# Patient Record
Sex: MALE | Race: ASIAN | NOT HISPANIC OR LATINO
[De-identification: names, ages, dates, MRNs, and addresses within clinical notes are randomized per-mention and may not be internally consistent; named-entity substitution may affect disease eponyms.]

---

## 2020-12-29 ENCOUNTER — APPOINTMENT (OUTPATIENT)
Dept: NEUROLOGY | Facility: CLINIC | Age: 80
End: 2020-12-29

## 2020-12-29 VITALS — TEMPERATURE: 96.7 F

## 2020-12-29 PROBLEM — Z00.00 ENCOUNTER FOR PREVENTIVE HEALTH EXAMINATION: Status: ACTIVE | Noted: 2020-12-29

## 2021-04-19 ENCOUNTER — APPOINTMENT (OUTPATIENT)
Dept: NEUROLOGY | Facility: CLINIC | Age: 81
End: 2021-04-19
Payer: MEDICARE

## 2021-04-19 VITALS — TEMPERATURE: 97.6 F

## 2021-04-19 VITALS
BODY MASS INDEX: 24.86 KG/M2 | HEIGHT: 68 IN | SYSTOLIC BLOOD PRESSURE: 167 MMHG | DIASTOLIC BLOOD PRESSURE: 82 MMHG | HEART RATE: 69 BPM | WEIGHT: 164 LBS

## 2021-04-19 DIAGNOSIS — E78.5 HYPERLIPIDEMIA, UNSPECIFIED: ICD-10-CM

## 2021-04-19 DIAGNOSIS — I10 ESSENTIAL (PRIMARY) HYPERTENSION: ICD-10-CM

## 2021-04-19 DIAGNOSIS — E11.9 TYPE 2 DIABETES MELLITUS W/OUT COMPLICATIONS: ICD-10-CM

## 2021-04-19 DIAGNOSIS — R68.89 OTHER GENERAL SYMPTOMS AND SIGNS: ICD-10-CM

## 2021-04-19 DIAGNOSIS — I48.91 UNSPECIFIED ATRIAL FIBRILLATION: ICD-10-CM

## 2021-04-19 PROCEDURE — 99205 OFFICE O/P NEW HI 60 MIN: CPT

## 2021-04-19 PROCEDURE — 99072 ADDL SUPL MATRL&STAF TM PHE: CPT

## 2021-04-19 RX ORDER — BENAZEPRIL HYDROCHLORIDE 40 MG/1
40 TABLET, FILM COATED ORAL DAILY
Refills: 0 | Status: ACTIVE | COMMUNITY
Start: 2021-04-19

## 2021-04-19 RX ORDER — ATORVASTATIN CALCIUM 20 MG/1
20 TABLET, FILM COATED ORAL
Refills: 0 | Status: ACTIVE | COMMUNITY
Start: 2021-04-19

## 2021-04-19 RX ORDER — DRONEDARONE 400 MG/1
400 TABLET, FILM COATED ORAL
Refills: 0 | Status: ACTIVE | COMMUNITY
Start: 2021-04-19

## 2021-04-19 RX ORDER — NEBIVOLOL HYDROCHLORIDE 5 MG/1
5 TABLET ORAL DAILY
Refills: 0 | Status: ACTIVE | COMMUNITY

## 2021-04-19 RX ORDER — METFORMIN HYDROCHLORIDE 500 MG/1
500 TABLET, COATED ORAL
Refills: 0 | Status: ACTIVE | COMMUNITY
Start: 2021-04-19

## 2021-04-19 RX ORDER — RIVAROXABAN 20 MG/1
20 TABLET, FILM COATED ORAL
Refills: 0 | Status: ACTIVE | COMMUNITY

## 2021-04-19 NOTE — HISTORY OF PRESENT ILLNESS
[FreeTextEntry1] : HPI: 81yo RH man with HTN, HLD, DM, here for forgetfulness.\par Exotropia (bilat) which dates many years ago, has diplopia, e/p sx years ago at [facility], recurred. Unclear etiology.\par  \par PMH: pt has noticed to have STM issues for 2-3 years, getting worse more recently.\par \par -Memory: STM, recent events, reason he goes into a room for etc. Names ok\par -Speech: ok, seldom WFD and losing train of thought\par -Orientation: ok\par -Praxis: ok\par -Decision making/Executive fx/Multitasking: ok\par \par -Sleep: ok\par \par -Appetite: ok\par \par -Motor symptoms: ok, no issues\par \par -B/B: ok\par \par -Psychiatric symptoms: ok\par \par -Functional status:\par ADL: full\par IADL: full, stopped driving due to pain in knees\par CDR: n.a.\par \par -Professional status: retired, bank\par \par PCP and other physicians:\par -PCP: \par Luis Regalado MD\par [place], [place] · [phone]\par -Cardio:\par Dr. Nhung Liang MD\par Internist in [place], [place]\par COVID-19 info: [email or web address]\par Address: [address] [place] [id number]\par Phone: [phone]\par \par Workup done: none.

## 2021-04-19 NOTE — PHYSICAL EXAM
[General Appearance - Alert] : alert [General Appearance - In No Acute Distress] : in no acute distress [Impaired Insight] : insight and judgment were intact [Oriented To Time, Place, And Person] : oriented to person, place, and time [Affect] : the affect was normal [Person] : oriented to person [Place] : oriented to place [Time] : oriented to time [Concentration Intact] : normal concentrating ability [Visual Intact] : visual attention was ~T not ~L decreased [Naming Objects] : no difficulty naming common objects [Repeating Phrases] : no difficulty repeating a phrase [Writing A Sentence] : no difficulty writing a sentence [Fluency] : fluency intact [Comprehension] : comprehension intact [Reading] : reading intact [Past History] : adequate knowledge of personal past history [Date / Time ___ / 5] : date / time [unfilled] / 5 [Place ___ / 5] : place [unfilled] / 5 [Registration ___ / 3] : registration [unfilled] / 3 [Serial Sevens ___/5] : serial sevens [unfilled] / 5 [Naming 2 Objects ___ / 2] : naming two objects [unfilled] / 2 [Repeating a Sentence ___ / 1] : repeating a sentence [unfilled] / 1 [Writing a Sentence ___ / 1] : write sentence [unfilled] / 1 [3-stage Verbal Command ___ / 3] : three-stage verbal command [unfilled] / 3 [Written Command ___ / 1] : written command [unfilled] / 1 [Cranial Nerves Optic (II)] : visual acuity intact bilaterally,  visual fields full to confrontation, pupils equal round and reactive to light [Cranial Nerves Oculomotor (III)] : extraocular motion intact [Cranial Nerves Trigeminal (V)] : facial sensation intact symmetrically [Cranial Nerves Facial (VII)] : face symmetrical [Cranial Nerves Glossopharyngeal (IX)] : tongue and palate midline [Cranial Nerves Vestibulocochlear (VIII)] : hearing was intact bilaterally [Cranial Nerves Accessory (XI - Cranial And Spinal)] : head turning and shoulder shrug symmetric [Cranial Nerves Hypoglossal (XII)] : there was no tongue deviation with protrusion [Motor Strength] : muscle strength was normal in all four extremities [No Muscle Atrophy] : normal bulk in all four extremities [Sensation Tactile Decrease] : light touch was intact [Balance] : balance was intact [2+] : Brachioradialis left 2+ [Current Events] : adequate knowledge of current events [Total Score ___ / 30] : the patient achieved a score of [unfilled] /30 [Copy a Design ___ / 1] : copy a design [unfilled] / 1 [Recall ___ / 3] : recall [unfilled] / 3 [Involuntary Movements] : no involuntary movements were seen [Motor Handedness Right-Handed] : the patient is right hand dominant [Sensation Pain / Temperature Decrease] : pain and temperature was intact [Sensation Vibration Decrease] : vibration was intact [Proprioception] : proprioception was intact [1+] : Ankle jerk left 1+ [Sclera] : the sclera and conjunctiva were normal [PERRL With Normal Accommodation] : pupils were equal in size, round, reactive to light, with normal accommodation [Extraocular Movements] : extraocular movements were intact [Optic Disc Abnormality] : the optic disc were normal in size and color [No APD] : no afferent pupillary defect [No MARIEL] : no internuclear ophthalmoplegia [Full Visual Field] : full visual field [Outer Ear] : the ears and nose were normal in appearance [Oropharynx] : the oropharynx was normal [Neck Appearance] : the appearance of the neck was normal [Neck Cervical Mass (___cm)] : no neck mass was observed [Jugular Venous Distention Increased] : there was no jugular-venous distention [Thyroid Diffuse Enlargement] : the thyroid was not enlarged [Thyroid Nodule] : there were no palpable thyroid nodules [Auscultation Breath Sounds / Voice Sounds] : lungs were clear to auscultation bilaterally [Heart Rate And Rhythm] : heart rate was normal and rhythm regular [Heart Sounds] : normal S1 and S2 [Heart Sounds Gallop] : no gallops [Murmurs] : no murmurs [Heart Sounds Pericardial Friction Rub] : no pericardial rub [Arterial Pulses Carotid] : carotid pulses were normal with no bruits [Full Pulse] : the pedal pulses are present [Edema] : there was no peripheral edema [Bowel Sounds] : normal bowel sounds [Abdomen Soft] : soft [Abdomen Tenderness] : non-tender [Abdomen Mass (___ Cm)] : no abdominal mass palpated [No CVA Tenderness] : no ~M costovertebral angle tenderness [No Spinal Tenderness] : no spinal tenderness [Abnormal Walk] : normal gait [Nail Clubbing] : no clubbing  or cyanosis of the fingernails [Musculoskeletal - Swelling] : no joint swelling seen [Motor Tone] : muscle strength and tone were normal [Skin Color & Pigmentation] : normal skin color and pigmentation [] : no rash [Skin Turgor] : normal skin turgor [Motor Strength Upper Extremities Bilaterally] : strength was normal in both upper extremities [Romberg's Sign] : Romberg's sign was negtive [Motor Strength Lower Extremities Bilaterally] : strength was normal in both lower extremities [Allodynia] : no ~T allodynia present [Dysesthesia] : no dysesthesia [Hyperesthesia] : no hyperesthesia [Past-pointing] : there was no past-pointing [Tremor] : no tremor present [Plantar Reflex Right Only] : normal on the right [Plantar Reflex Left Only] : normal on the left [FreeTextEntry4] : Mental Status Exam\par Presidents: 3/5\par Alternating Pattern: ok\par Spiral: ok\par Clock: 3/3\par Repetition: ok \par \par R/L discrimination on self and examiner: ok\par Cross-line commands: ok\par Praxis: ok [FreeTextEntry5] : Eugene exotropia, mild diplopia [FreeTextEntry1] : Normal EOM, but there is variable exotropia. No skew.

## 2021-04-19 NOTE — ASSESSMENT
[FreeTextEntry1] : Assessment:\par 79yo RH man with vascular risk, here for subjective forgetfulness.\par Exam mostly benign. \par \par Diagnostic Impression:\par -forgetfulness\par \par Plan:\par Rule out reversible and vascular causes:\par -B vitamins, TFT, RPR\par -MRI brain w/o ulises\par -basic inflammatory labs\par -Lyme serology\par -pt to call in accurate list of meds, as he does not know the dosage\par -no meds for now.\par \par A thorough discussion was entertained with the patient/caregiver regarding the use of psychoactive medications, their possible benefits and AE profile, including the risk of cardiovascular complications, including but not limited to applicable black box warning and teratogenicity, where appropriate.\par We discussed the benefits of being active, physically and mentally, and the need to to establish a routine in this respect.\par Driving abilities and firearms possession and use were discussed, in relation to progression of the cognitive decline, and the need to assess them periodically.\par Patient/caregiver advised to bring previous records to this office.\par All questions were answered at the time of the visit. We are certainly available for further discussion as needed.\par Patient/caregiver fully understands and agree with the plan.\par

## 2021-05-16 ENCOUNTER — APPOINTMENT (OUTPATIENT)
Dept: MRI IMAGING | Facility: CLINIC | Age: 81
End: 2021-05-16

## 2021-05-16 LAB
B BURGDOR AB SER-IMP: NEGATIVE
B BURGDOR IGG+IGM SER QL: 0.16 INDEX
FOLATE SERPL-MCNC: 14.1 NG/ML
HCYS SERPL-MCNC: 11.6 UMOL/L
T PALLIDUM AB SER QL IA: NEGATIVE
T3FREE SERPL-MCNC: 2.53 PG/ML
T4 FREE SERPL-MCNC: 1.1 NG/DL
TSH SERPL-ACNC: 0.27 UIU/ML
VIT B12 SERPL-MCNC: 392 PG/ML

## 2021-05-21 ENCOUNTER — APPOINTMENT (OUTPATIENT)
Dept: MRI IMAGING | Facility: CLINIC | Age: 81
End: 2021-05-21

## 2021-05-21 LAB — VIT B1 SERPL-MCNC: 92.5 NMOL/L

## 2021-05-27 LAB — METHYLMALONATE SERPL-SCNC: 366 NMOL/L

## 2021-05-28 ENCOUNTER — APPOINTMENT (OUTPATIENT)
Dept: MRI IMAGING | Facility: CLINIC | Age: 81
End: 2021-05-28

## 2021-05-29 ENCOUNTER — APPOINTMENT (OUTPATIENT)
Dept: MRI IMAGING | Facility: IMAGING CENTER | Age: 81
End: 2021-05-29
Payer: MEDICARE

## 2021-05-29 ENCOUNTER — OUTPATIENT (OUTPATIENT)
Dept: OUTPATIENT SERVICES | Facility: HOSPITAL | Age: 81
LOS: 1 days | End: 2021-05-29
Payer: MEDICARE

## 2021-05-29 DIAGNOSIS — R68.89 OTHER GENERAL SYMPTOMS AND SIGNS: ICD-10-CM

## 2021-05-29 PROCEDURE — 70551 MRI BRAIN STEM W/O DYE: CPT | Mod: 26

## 2021-06-01 PROCEDURE — 70551 MRI BRAIN STEM W/O DYE: CPT

## 2021-06-14 ENCOUNTER — NON-APPOINTMENT (OUTPATIENT)
Age: 81
End: 2021-06-14

## 2021-06-15 RX ORDER — GALANTAMINE 8 MG/1
8 CAPSULE, EXTENDED RELEASE ORAL
Qty: 30 | Refills: 1 | Status: ACTIVE | COMMUNITY
Start: 2021-06-15 | End: 1900-01-01

## 2021-10-19 ENCOUNTER — APPOINTMENT (OUTPATIENT)
Dept: NEUROLOGY | Facility: CLINIC | Age: 81
End: 2021-10-19

## 2024-02-29 ENCOUNTER — INPATIENT (INPATIENT)
Facility: HOSPITAL | Age: 84
LOS: 4 days | Discharge: HOME | End: 2024-03-05
Attending: INTERNAL MEDICINE | Admitting: INTERNAL MEDICINE
Payer: MEDICARE

## 2024-02-29 VITALS
RESPIRATION RATE: 15 BRPM | HEART RATE: 85 BPM | OXYGEN SATURATION: 100 % | TEMPERATURE: 100 F | DIASTOLIC BLOOD PRESSURE: 85 MMHG | SYSTOLIC BLOOD PRESSURE: 150 MMHG

## 2024-02-29 LAB
ALBUMIN SERPL ELPH-MCNC: 4 G/DL — SIGNIFICANT CHANGE UP (ref 3.3–5)
ALP SERPL-CCNC: 89 U/L — SIGNIFICANT CHANGE UP (ref 40–120)
ALT FLD-CCNC: 18 U/L — SIGNIFICANT CHANGE UP (ref 4–41)
ANION GAP SERPL CALC-SCNC: 10 MMOL/L — SIGNIFICANT CHANGE UP (ref 7–14)
APTT BLD: 34.6 SEC — SIGNIFICANT CHANGE UP (ref 24.5–35.6)
AST SERPL-CCNC: 31 U/L — SIGNIFICANT CHANGE UP (ref 4–40)
BASE EXCESS BLDV CALC-SCNC: -0.7 MMOL/L — SIGNIFICANT CHANGE UP (ref -2–3)
BILIRUB SERPL-MCNC: 0.5 MG/DL — SIGNIFICANT CHANGE UP (ref 0.2–1.2)
BLOOD GAS VENOUS COMPREHENSIVE RESULT: SIGNIFICANT CHANGE UP
BUN SERPL-MCNC: 22 MG/DL — SIGNIFICANT CHANGE UP (ref 7–23)
CALCIUM SERPL-MCNC: 8.7 MG/DL — SIGNIFICANT CHANGE UP (ref 8.4–10.5)
CHLORIDE BLDV-SCNC: 100 MMOL/L — SIGNIFICANT CHANGE UP (ref 96–108)
CHLORIDE SERPL-SCNC: 101 MMOL/L — SIGNIFICANT CHANGE UP (ref 98–107)
CO2 BLDV-SCNC: 26.8 MMOL/L — HIGH (ref 22–26)
CO2 SERPL-SCNC: 25 MMOL/L — SIGNIFICANT CHANGE UP (ref 22–31)
CREAT SERPL-MCNC: 1.09 MG/DL — SIGNIFICANT CHANGE UP (ref 0.5–1.3)
EGFR: 67 ML/MIN/1.73M2 — SIGNIFICANT CHANGE UP
GAS PNL BLDV: 131 MMOL/L — LOW (ref 136–145)
GLUCOSE BLDV-MCNC: 176 MG/DL — HIGH (ref 70–99)
GLUCOSE SERPL-MCNC: 175 MG/DL — HIGH (ref 70–99)
HCO3 BLDV-SCNC: 25 MMOL/L — SIGNIFICANT CHANGE UP (ref 22–29)
HCT VFR BLD CALC: 39.8 % — SIGNIFICANT CHANGE UP (ref 39–50)
HCT VFR BLDA CALC: 37 % — LOW (ref 39–51)
HGB BLD CALC-MCNC: 12.4 G/DL — LOW (ref 12.6–17.4)
HGB BLD-MCNC: 12.2 G/DL — LOW (ref 13–17)
IANC: 5.69 K/UL — SIGNIFICANT CHANGE UP (ref 1.8–7.4)
INR BLD: 1.06 RATIO — SIGNIFICANT CHANGE UP (ref 0.85–1.18)
LACTATE BLDV-MCNC: 1 MMOL/L — SIGNIFICANT CHANGE UP (ref 0.5–2)
MCHC RBC-ENTMCNC: 25.5 PG — LOW (ref 27–34)
MCHC RBC-ENTMCNC: 30.7 GM/DL — LOW (ref 32–36)
MCV RBC AUTO: 83.3 FL — SIGNIFICANT CHANGE UP (ref 80–100)
PCO2 BLDV: 47 MMHG — SIGNIFICANT CHANGE UP (ref 42–55)
PH BLDV: 7.34 — SIGNIFICANT CHANGE UP (ref 7.32–7.43)
PLATELET # BLD AUTO: 104 K/UL — LOW (ref 150–400)
PO2 BLDV: 25 MMHG — SIGNIFICANT CHANGE UP (ref 25–45)
POTASSIUM BLDV-SCNC: 4.8 MMOL/L — SIGNIFICANT CHANGE UP (ref 3.5–5.1)
POTASSIUM SERPL-MCNC: 4.9 MMOL/L — SIGNIFICANT CHANGE UP (ref 3.5–5.3)
POTASSIUM SERPL-SCNC: 4.9 MMOL/L — SIGNIFICANT CHANGE UP (ref 3.5–5.3)
PROT SERPL-MCNC: 7.5 G/DL — SIGNIFICANT CHANGE UP (ref 6–8.3)
PROTHROM AB SERPL-ACNC: 12 SEC — SIGNIFICANT CHANGE UP (ref 9.5–13)
RBC # BLD: 4.78 M/UL — SIGNIFICANT CHANGE UP (ref 4.2–5.8)
RBC # FLD: 13.7 % — SIGNIFICANT CHANGE UP (ref 10.3–14.5)
SAO2 % BLDV: 39.4 % — LOW (ref 67–88)
SODIUM SERPL-SCNC: 136 MMOL/L — SIGNIFICANT CHANGE UP (ref 135–145)
WBC # BLD: 6.93 K/UL — SIGNIFICANT CHANGE UP (ref 3.8–10.5)
WBC # FLD AUTO: 6.93 K/UL — SIGNIFICANT CHANGE UP (ref 3.8–10.5)

## 2024-02-29 PROCEDURE — 99291 CRITICAL CARE FIRST HOUR: CPT

## 2024-02-29 RX ORDER — MAGNESIUM SULFATE 500 MG/ML
2 VIAL (ML) INJECTION ONCE
Refills: 0 | Status: COMPLETED | OUTPATIENT
Start: 2024-02-29 | End: 2024-02-29

## 2024-02-29 RX ORDER — ACETAMINOPHEN 500 MG
1000 TABLET ORAL ONCE
Refills: 0 | Status: COMPLETED | OUTPATIENT
Start: 2024-02-29 | End: 2024-02-29

## 2024-02-29 RX ORDER — IPRATROPIUM/ALBUTEROL SULFATE 18-103MCG
3 AEROSOL WITH ADAPTER (GRAM) INHALATION
Refills: 0 | Status: COMPLETED | OUTPATIENT
Start: 2024-02-29 | End: 2024-02-29

## 2024-02-29 RX ADMIN — Medication 3 MILLILITER(S): at 23:36

## 2024-02-29 RX ADMIN — Medication 125 MILLIGRAM(S): at 23:51

## 2024-02-29 RX ADMIN — Medication 400 MILLIGRAM(S): at 23:52

## 2024-02-29 RX ADMIN — Medication 150 GRAM(S): at 23:52

## 2024-02-29 NOTE — ED ADULT NURSE NOTE - CHIEF COMPLAINT QUOTE
FLU+.2/26. presents c/o SOB. x5 days. worsening today. placed on 2L nc.  No complaints of chest pain, headache, nausea, dizziness, vomiting  SOB, fever, chills verbalized. Phx afib HTN Dm2

## 2024-02-29 NOTE — ED ADULT TRIAGE NOTE - CHIEF COMPLAINT QUOTE
presents c/o SOB. x5 days. worsening today. placed on 2L nc.  No complaints of chest pain, headache, nausea, dizziness, vomiting  SOB, fever, chills verbalized. Phx afib HTN Dm2 FLU+.2/26. presents c/o SOB. x5 days. worsening today. placed on 2L nc.  No complaints of chest pain, headache, nausea, dizziness, vomiting  SOB, fever, chills verbalized. Phx afib HTN Dm2

## 2024-02-29 NOTE — ED ADULT NURSE NOTE - OBJECTIVE STATEMENT
Pt brought to room 20, FLU+.2/26. presents c/o SOB. x5 days. worsening today. placed on 2L nc.  No complaints of chest pain, headache, nausea, dizziness, vomiting  SOB, fever, chills verbalized. Phx afib HTN Dm2  report given from nurse , patient laying in semi fowlers position on the stretcher. patient alert and oriented times four. patient denies shortness of breath, chest pain, nausea, vomiting, chill, fever. Patient normal sinus on the monitor. Respirations equal and adequate. Two IVs placed 20 right arm and 20 left a.c patent, no signs of infiltration. Safety measures in place, call bell within reach. Patient stable upon leaving the room. Blood cultures sent prior to antibiotic administration. Patient febrile MD. weight in chart.   Shadia aware. Patient to be medicated as per emr. patient on 2 liters nasal cannula and satting at 98 percent. Pt brought to room 20, FLU+.2/26. presents c/o SOB. x5 days. worsening today. placed on 2L nc.  No complaints of chest pain, headache, nausea, dizziness, vomiting  SOB, fever, chills verbalized. Phx afib HTN Dm2  report given from nurse , patient laying in semi fowlers position on the stretcher. patient alert and oriented times four, patient denies chest pain, nausea, vomiting, chill, fever. Patient normal sinus on the monitor. Respirations equal and adequate. Two IVs placed 20 right arm and 20 left a.c patent, no signs of infiltration. Safety measures in place, call bell within reach. Patient stable upon leaving the room. Blood cultures sent prior to antibiotic administration. Patient febrile MD. weight in chart.   Shadia aware. Patient to be medicated as per emr. patient on 2 liters nasal cannula and satting at 98 percent.

## 2024-02-29 NOTE — ED ADULT NURSE NOTE - NSFALLUNIVINTERV_ED_ALL_ED
Bed/Stretcher in lowest position, wheels locked, appropriate side rails in place/Call bell, personal items and telephone in reach/Instruct patient to call for assistance before getting out of bed/chair/stretcher/Non-slip footwear applied when patient is off stretcher/[place] to call system/Physically safe environment - no spills, clutter or unnecessary equipment/Purposeful proactive rounding/Room/bathroom lighting operational, light cord in reach

## 2024-03-01 DIAGNOSIS — J96.01 ACUTE RESPIRATORY FAILURE WITH HYPOXIA: ICD-10-CM

## 2024-03-01 DIAGNOSIS — I48.91 UNSPECIFIED ATRIAL FIBRILLATION: ICD-10-CM

## 2024-03-01 DIAGNOSIS — J45.909 UNSPECIFIED ASTHMA, UNCOMPLICATED: ICD-10-CM

## 2024-03-01 DIAGNOSIS — Z29.9 ENCOUNTER FOR PROPHYLACTIC MEASURES, UNSPECIFIED: ICD-10-CM

## 2024-03-01 DIAGNOSIS — I10 ESSENTIAL (PRIMARY) HYPERTENSION: ICD-10-CM

## 2024-03-01 DIAGNOSIS — J45.901 UNSPECIFIED ASTHMA WITH (ACUTE) EXACERBATION: ICD-10-CM

## 2024-03-01 LAB
ADD ON TEST-SPECIMEN IN LAB: SIGNIFICANT CHANGE UP
APPEARANCE UR: CLEAR — SIGNIFICANT CHANGE UP
BACTERIA # UR AUTO: NEGATIVE /HPF — SIGNIFICANT CHANGE UP
BASOPHILS # BLD AUTO: 0.01 K/UL — SIGNIFICANT CHANGE UP (ref 0–0.2)
BASOPHILS NFR BLD AUTO: 0.1 % — SIGNIFICANT CHANGE UP (ref 0–2)
BILIRUB UR-MCNC: NEGATIVE — SIGNIFICANT CHANGE UP
CAST: 1 /LPF — SIGNIFICANT CHANGE UP (ref 0–4)
COLOR SPEC: YELLOW — SIGNIFICANT CHANGE UP
DIFF PNL FLD: ABNORMAL
EOSINOPHIL # BLD AUTO: 0 K/UL — SIGNIFICANT CHANGE UP (ref 0–0.5)
EOSINOPHIL NFR BLD AUTO: 0 % — SIGNIFICANT CHANGE UP (ref 0–6)
FLUAV AG NPH QL: SIGNIFICANT CHANGE UP
FLUBV AG NPH QL: DETECTED
GLUCOSE BLDC GLUCOMTR-MCNC: 192 MG/DL — HIGH (ref 70–99)
GLUCOSE BLDC GLUCOMTR-MCNC: 216 MG/DL — HIGH (ref 70–99)
GLUCOSE BLDC GLUCOMTR-MCNC: 233 MG/DL — HIGH (ref 70–99)
GLUCOSE BLDC GLUCOMTR-MCNC: 244 MG/DL — HIGH (ref 70–99)
GLUCOSE BLDC GLUCOMTR-MCNC: 249 MG/DL — HIGH (ref 70–99)
GLUCOSE BLDC GLUCOMTR-MCNC: 287 MG/DL — HIGH (ref 70–99)
GLUCOSE UR QL: >=1000 MG/DL
IMM GRANULOCYTES NFR BLD AUTO: 0.4 % — SIGNIFICANT CHANGE UP (ref 0–0.9)
KETONES UR-MCNC: 15 MG/DL
LEUKOCYTE ESTERASE UR-ACNC: NEGATIVE — SIGNIFICANT CHANGE UP
LYMPHOCYTES # BLD AUTO: 0.77 K/UL — LOW (ref 1–3.3)
LYMPHOCYTES # BLD AUTO: 11.1 % — LOW (ref 13–44)
MONOCYTES # BLD AUTO: 0.43 K/UL — SIGNIFICANT CHANGE UP (ref 0–0.9)
MONOCYTES NFR BLD AUTO: 6.2 % — SIGNIFICANT CHANGE UP (ref 2–14)
NEUTROPHILS # BLD AUTO: 5.69 K/UL — SIGNIFICANT CHANGE UP (ref 1.8–7.4)
NEUTROPHILS NFR BLD AUTO: 82.2 % — HIGH (ref 43–77)
NITRITE UR-MCNC: NEGATIVE — SIGNIFICANT CHANGE UP
NRBC # BLD: 0 /100 WBCS — SIGNIFICANT CHANGE UP (ref 0–0)
NRBC # FLD: 0 K/UL — SIGNIFICANT CHANGE UP (ref 0–0)
PH UR: 6 — SIGNIFICANT CHANGE UP (ref 5–8)
PROCALCITONIN SERPL-MCNC: 0.1 NG/ML — SIGNIFICANT CHANGE UP (ref 0.02–0.1)
PROT UR-MCNC: 30 MG/DL
RBC CASTS # UR COMP ASSIST: 0 /HPF — SIGNIFICANT CHANGE UP (ref 0–4)
RSV RNA NPH QL NAA+NON-PROBE: SIGNIFICANT CHANGE UP
SARS-COV-2 RNA SPEC QL NAA+PROBE: SIGNIFICANT CHANGE UP
SP GR SPEC: 1.02 — SIGNIFICANT CHANGE UP (ref 1–1.03)
SQUAMOUS # UR AUTO: 0 /HPF — SIGNIFICANT CHANGE UP (ref 0–5)
TROPONIN T, HIGH SENSITIVITY RESULT: 30 NG/L — SIGNIFICANT CHANGE UP
UROBILINOGEN FLD QL: 0.2 MG/DL — SIGNIFICANT CHANGE UP (ref 0.2–1)
WBC UR QL: 0 /HPF — SIGNIFICANT CHANGE UP (ref 0–5)

## 2024-03-01 PROCEDURE — 99223 1ST HOSP IP/OBS HIGH 75: CPT

## 2024-03-01 PROCEDURE — 71045 X-RAY EXAM CHEST 1 VIEW: CPT | Mod: 26

## 2024-03-01 RX ORDER — RIVAROXABAN 15 MG-20MG
20 KIT ORAL DAILY
Refills: 0 | Status: DISCONTINUED | OUTPATIENT
Start: 2024-03-01 | End: 2024-03-05

## 2024-03-01 RX ORDER — DEXTROSE 50 % IN WATER 50 %
25 SYRINGE (ML) INTRAVENOUS ONCE
Refills: 0 | Status: DISCONTINUED | OUTPATIENT
Start: 2024-03-01 | End: 2024-03-05

## 2024-03-01 RX ORDER — CEFTRIAXONE 500 MG/1
1000 INJECTION, POWDER, FOR SOLUTION INTRAMUSCULAR; INTRAVENOUS EVERY 24 HOURS
Refills: 0 | Status: DISCONTINUED | OUTPATIENT
Start: 2024-03-01 | End: 2024-03-05

## 2024-03-01 RX ORDER — AZITHROMYCIN 500 MG/1
500 TABLET, FILM COATED ORAL ONCE
Refills: 0 | Status: COMPLETED | OUTPATIENT
Start: 2024-03-01 | End: 2024-03-01

## 2024-03-01 RX ORDER — CEFTRIAXONE 500 MG/1
1000 INJECTION, POWDER, FOR SOLUTION INTRAMUSCULAR; INTRAVENOUS ONCE
Refills: 0 | Status: COMPLETED | OUTPATIENT
Start: 2024-03-01 | End: 2024-03-01

## 2024-03-01 RX ORDER — INSULIN LISPRO 100/ML
VIAL (ML) SUBCUTANEOUS
Refills: 0 | Status: DISCONTINUED | OUTPATIENT
Start: 2024-03-01 | End: 2024-03-05

## 2024-03-01 RX ORDER — BUDESONIDE AND FORMOTEROL FUMARATE DIHYDRATE 160; 4.5 UG/1; UG/1
2 AEROSOL RESPIRATORY (INHALATION)
Refills: 0 | Status: DISCONTINUED | OUTPATIENT
Start: 2024-03-01 | End: 2024-03-05

## 2024-03-01 RX ORDER — AZITHROMYCIN 500 MG/1
500 TABLET, FILM COATED ORAL EVERY 24 HOURS
Refills: 0 | Status: DISCONTINUED | OUTPATIENT
Start: 2024-03-01 | End: 2024-03-02

## 2024-03-01 RX ORDER — DRONEDARONE 400 MG/1
400 TABLET, FILM COATED ORAL
Refills: 0 | Status: DISCONTINUED | OUTPATIENT
Start: 2024-03-01 | End: 2024-03-05

## 2024-03-01 RX ORDER — ATORVASTATIN CALCIUM 80 MG/1
1 TABLET, FILM COATED ORAL
Refills: 0 | DISCHARGE

## 2024-03-01 RX ORDER — NEBIVOLOL HYDROCHLORIDE 5 MG/1
2.5 TABLET ORAL DAILY
Refills: 0 | Status: DISCONTINUED | OUTPATIENT
Start: 2024-03-01 | End: 2024-03-05

## 2024-03-01 RX ORDER — UMECLIDINIUM 62.5 UG/1
1 AEROSOL, POWDER ORAL
Refills: 0 | DISCHARGE

## 2024-03-01 RX ORDER — GABAPENTIN 400 MG/1
300 CAPSULE ORAL THREE TIMES A DAY
Refills: 0 | Status: DISCONTINUED | OUTPATIENT
Start: 2024-03-01 | End: 2024-03-05

## 2024-03-01 RX ORDER — TIOTROPIUM BROMIDE 18 UG/1
2 CAPSULE ORAL; RESPIRATORY (INHALATION) DAILY
Refills: 0 | Status: DISCONTINUED | OUTPATIENT
Start: 2024-03-01 | End: 2024-03-05

## 2024-03-01 RX ORDER — DEXTROSE 50 % IN WATER 50 %
12.5 SYRINGE (ML) INTRAVENOUS ONCE
Refills: 0 | Status: DISCONTINUED | OUTPATIENT
Start: 2024-03-01 | End: 2024-03-05

## 2024-03-01 RX ORDER — ATORVASTATIN CALCIUM 80 MG/1
20 TABLET, FILM COATED ORAL AT BEDTIME
Refills: 0 | Status: DISCONTINUED | OUTPATIENT
Start: 2024-03-01 | End: 2024-03-05

## 2024-03-01 RX ORDER — INSULIN LISPRO 100/ML
VIAL (ML) SUBCUTANEOUS AT BEDTIME
Refills: 0 | Status: DISCONTINUED | OUTPATIENT
Start: 2024-03-01 | End: 2024-03-05

## 2024-03-01 RX ORDER — LISINOPRIL 2.5 MG/1
10 TABLET ORAL DAILY
Refills: 0 | Status: DISCONTINUED | OUTPATIENT
Start: 2024-03-01 | End: 2024-03-05

## 2024-03-01 RX ORDER — GLUCAGON INJECTION, SOLUTION 0.5 MG/.1ML
1 INJECTION, SOLUTION SUBCUTANEOUS ONCE
Refills: 0 | Status: DISCONTINUED | OUTPATIENT
Start: 2024-03-01 | End: 2024-03-05

## 2024-03-01 RX ORDER — FLUTICASONE PROPIONATE 50 MCG
1 SPRAY, SUSPENSION NASAL
Refills: 0 | Status: DISCONTINUED | OUTPATIENT
Start: 2024-03-01 | End: 2024-03-05

## 2024-03-01 RX ORDER — IPRATROPIUM/ALBUTEROL SULFATE 18-103MCG
3 AEROSOL WITH ADAPTER (GRAM) INHALATION EVERY 6 HOURS
Refills: 0 | Status: DISCONTINUED | OUTPATIENT
Start: 2024-03-01 | End: 2024-03-05

## 2024-03-01 RX ORDER — DEXTROSE 50 % IN WATER 50 %
15 SYRINGE (ML) INTRAVENOUS ONCE
Refills: 0 | Status: DISCONTINUED | OUTPATIENT
Start: 2024-03-01 | End: 2024-03-05

## 2024-03-01 RX ADMIN — BUDESONIDE AND FORMOTEROL FUMARATE DIHYDRATE 2 PUFF(S): 160; 4.5 AEROSOL RESPIRATORY (INHALATION) at 22:16

## 2024-03-01 RX ADMIN — Medication 100 MILLIGRAM(S): at 14:11

## 2024-03-01 RX ADMIN — GABAPENTIN 300 MILLIGRAM(S): 400 CAPSULE ORAL at 14:09

## 2024-03-01 RX ADMIN — GABAPENTIN 300 MILLIGRAM(S): 400 CAPSULE ORAL at 22:17

## 2024-03-01 RX ADMIN — Medication 1000 MILLIGRAM(S): at 02:31

## 2024-03-01 RX ADMIN — RIVAROXABAN 20 MILLIGRAM(S): KIT at 14:09

## 2024-03-01 RX ADMIN — Medication 3 MILLILITER(S): at 00:32

## 2024-03-01 RX ADMIN — Medication 600 MILLIGRAM(S): at 18:24

## 2024-03-01 RX ADMIN — AZITHROMYCIN 255 MILLIGRAM(S): 500 TABLET, FILM COATED ORAL at 07:08

## 2024-03-01 RX ADMIN — CEFTRIAXONE 100 MILLIGRAM(S): 500 INJECTION, POWDER, FOR SOLUTION INTRAMUSCULAR; INTRAVENOUS at 05:58

## 2024-03-01 RX ADMIN — Medication 3 MILLILITER(S): at 02:23

## 2024-03-01 RX ADMIN — ATORVASTATIN CALCIUM 20 MILLIGRAM(S): 80 TABLET, FILM COATED ORAL at 22:17

## 2024-03-01 RX ADMIN — Medication 3 MILLILITER(S): at 00:26

## 2024-03-01 RX ADMIN — DRONEDARONE 400 MILLIGRAM(S): 400 TABLET, FILM COATED ORAL at 18:25

## 2024-03-01 RX ADMIN — Medication 3: at 11:54

## 2024-03-01 RX ADMIN — Medication 1 SPRAY(S): at 18:23

## 2024-03-01 RX ADMIN — Medication 100 MILLIGRAM(S): at 22:16

## 2024-03-01 RX ADMIN — Medication 3 MILLILITER(S): at 22:55

## 2024-03-01 RX ADMIN — Medication 3 MILLILITER(S): at 18:24

## 2024-03-01 RX ADMIN — Medication 3 MILLILITER(S): at 09:51

## 2024-03-01 NOTE — H&P ADULT - PROBLEM SELECTOR PLAN 2
--Resume home meds. Pt unsure of his medications. Pharmacy closed until 11am  --Will obtain med list and resume meds --c/w home meds

## 2024-03-01 NOTE — H&P ADULT - ASSESSMENT
82 y/o M PMhx Afib, DM2, Htn, Asthma p/w SOB admitted for acute hypoxic respiratory failure likely 2/2 asthma exaceration i/s/o viral infection +/- PNA.

## 2024-03-01 NOTE — H&P ADULT - NSHPLABSRESULTS_GEN_ALL_CORE
12.2   6.93  )-----------( 104      ( 2024 23:20 )             39.8           136  |  101  |  22  ----------------------------<  175<H>  4.9   |  25  |  1.09    Ca    8.7      2024 23:20    TPro  7.5  /  Alb  4.0  /  TBili  0.5  /  DBili  x   /  AST  31  /  ALT  18  /  AlkPhos  89                Urinalysis Basic - ( 01 Mar 2024 07:19 )    Color: Yellow / Appearance: Clear / S.020 / pH: x  Gluc: x / Ketone: 15 mg/dL  / Bili: Negative / Urobili: 0.2 mg/dL   Blood: x / Protein: 30 mg/dL / Nitrite: Negative   Leuk Esterase: Negative / RBC: 0 /HPF / WBC 0 /HPF   Sq Epi: x / Non Sq Epi: 0 /HPF / Bacteria: Negative /HPF        PT/INR - ( 2024 23:20 )   PT: 12.0 sec;   INR: 1.06 ratio         PTT - ( 2024 23:20 )  PTT:34.6 sec          CAPILLARY BLOOD GLUCOSE      POCT Blood Glucose.: 244 mg/dL (01 Mar 2024 03:46)        Xray Chest 1 View AP/PA:   ACC: [id number] EXAM:  XR CHEST AP OR PA 1V   ORDERED BY: NARESH BRYANT     PROCEDURE DATE:  2024          INTERPRETATION:  EXAMINATION: XR CHEST    CLINICAL INDICATION: Sepsis    TECHNIQUE: Single frontal, portable view of the chest was obtained.    COMPARISON: None    FINDINGS:      The heart is not accurately assessed in this AP projection.  Inhomogeneous opacification in the right upper lobe could represent early   pneumonia versus more chronic changes in this patient with known asthma.  There is no pleural effusion.  There is no pneumothorax.  No acute bony abnormality.    IMPRESSION: Known history of asthma with possible early right upper lobe   pneumonia (low to moderate index of suspicion)      --- End of Report ---          KILO HERMOSILLO MD; Resident Radiologist  This document has been electronically signed.  LAUREN BARKER MD; Attending Radiologist  This document has been electronically signed. Mar  1 2024  6:36AM (24 @ 00:22)        RADIOLOGY & ADDITIONAL TESTS:    Imaging personally reviewed.    Consultant(s) notes reviewed.    Care discussed with consultants and other providers.

## 2024-03-01 NOTE — H&P ADULT - HISTORY OF PRESENT ILLNESS
82 y/o M PMhx Afib, DM2, Htn, Asthma p/w SOB. Pt states he's in the hospital because he has been "suffering for a few days." Reports cough and SOB at home. States a few weeks ago, he was advised by son to go to the hospital but refused. Son instead took him to an outpatient provider who prescribed him medications for possible PNA (patient thinks may have been tetracycline). States he completed course of tx apx 2 weeks and felt a little better. Symptoms however worsened on 02/25, prompting pt to go to urgent care where he was started on tamiflu w/o significant improvement. ROS at this time sig for cough, SOB and HERNANDEZ. Denies fevers, chills, n/v, chest pain, abdominal pain, changes in bowel and urinary habits.

## 2024-03-01 NOTE — ED PROVIDER NOTE - PROGRESS NOTE DETAILS
Jerry Merritt,  (PGY-1) Patient reevaluated at bedside. Patient is doing well s/p 3 duonebs and mag and steroids, still wheezing and mildly tachypneic but less work of breathing. Will admit for asthma exacerbation iso flu. Patient and son amenable with plan.

## 2024-03-01 NOTE — H&P ADULT - PROBLEM SELECTOR PROBLEM 5
Prophylactic measure
[FreeTextEntry1] : mvi, proper diet and exercise\par pt never followed up with heme after recommended with May blood work

## 2024-03-01 NOTE — H&P ADULT - NSHPPHYSICALEXAM_GEN_ALL_CORE
GENERAL: NAD  HEAD:  Atraumatic, Normocephalic  EYES: EOMI, conjunctiva and sclera clear  NECK: Supple  CHEST/LUNG: +rhonchi B/L on auscultation w/ mild expiratory wheezing. No crackles . Normal respiratory effort.   HEART: Regular rate and rhythm; No murmurs, rubs, or gallops  ABDOMEN: Soft, Nontender, Nondistended; Bowel sounds present  EXTREMITIES:  2+ Peripheral Pulses, No edema  PSYCH: AAOx3  NEUROLOGY: non-focal  SKIN: Warm and dry

## 2024-03-01 NOTE — ED ADULT NURSE REASSESSMENT NOTE - NS ED NURSE REASSESS COMMENT FT1
patient got out of bed to use the bathroom and pulled out right arm IV. Left a.c IV patent and intact. FS unremarkable.

## 2024-03-01 NOTE — H&P ADULT - NSHPREVIEWOFSYSTEMS_GEN_ALL_CORE
CONSTITUTIONAL:  No weight loss, fever, chills, weakness . +fatigue.  HEENT:    No visual loss, blurred vision, No hearing loss, sneezing, congestion, runny nose or sore throat.  SKIN:  No rash or itching.  CARDIOVASCULAR:  No chest pain or chest discomfort. No palpitations.  RESPIRATORY:  + shortness of breath, cough , sputum.  GASTROINTESTINAL:  No nausea, vomiting or diarrhea. No abdominal pain or blood.  GENITOURINARY:  Denies hematuria, dysuria.   NEUROLOGICAL:  No headache, dizziness, numbness or tingling in the extremities. No change in bowel or bladder control.  MUSCULOSKELETAL:  No muscle or back pain  HEMATOLOGIC:  No  bleeding or bruising.  ENDOCRINOLOGIC:  No reports of sweating, cold or heat intolerance. No polyuria or polydipsia.

## 2024-03-01 NOTE — ED PROVIDER NOTE - PHYSICAL EXAMINATION
GENERAL: Tachypneic, accessory muscle use, protecting airway and able to talk.   HEENT:  Atraumatic  CHEST/LUNG: Chest rise equal bilaterally, +B/L wheezing and course lung sounds. +Accessory muscle use.   HEART: Regular rate and rhythm normal s1 s2 no m/r/g  ABDOMEN: Soft, Nontender, Nondistended  EXTREMITIES:  Extremities warm  PSYCH: A&Ox3  SKIN: No obvious rashes or lesions  MSK: No cervical spine TTP, able to range neck to the left and right/ No midline spinal TTP/ No swelling, redness, pain or discharge from   NEUROLOGY: strength and sensation intact in all extremities. CN 2 - 12 intact. Finger to nose test intact. No pronator drift.

## 2024-03-01 NOTE — ED PROVIDER NOTE - OBJECTIVE STATEMENT
82 y/o M PMhx Afib on Nebivolol and Xarelto, DM2 on metformin and Jardiance, Htn, Asthma presents with son at bedside (Irish speaking, speaks some english however son assists with history and translation per patient request) c/o SOB. They state about 6 weeks ago he was treated outpatient for pneumonia with 2 weeks of abx (does not recall which abx). His symptoms were improving until this past Sunday 02/25 when his cough became worse so he went to urgent care where he was started on Tamiflu (states had last dose today). Today his shortness of breath became worse and was unable to walk because of it. Took his inhaler at home but son states he does not seem to be using it right. Patient appears very tachypneic and SOB, audible wheezing.   Patient denies any fevers, chills, nausea, vomiting, chest pain, diarrhea, constipation, painful urination, new rashes.

## 2024-03-01 NOTE — ED ADULT NURSE REASSESSMENT NOTE - NS ED NURSE REASSESS COMMENT FT1
pt in room 20. A&Ox4. provided 2x more duoneb tx. NSR on monitor satting 99% on 2L NC. awaiting xray results. safety maintained

## 2024-03-01 NOTE — H&P ADULT - PROBLEM SELECTOR PLAN 1
--likely 2/2 asthma exacerbation i/s/o viral infection +/- PNA  --C/w CTX and azithro  --Duonebs Q6H  --Obtain Sputum cx/ MRSA PCR  --Supportive mgt  --Wean off oxygen as tolerated --likely 2/2 asthma exacerbation i/s/o viral infection +/- PNA  --s/p Tamiflu outpatient for influenza   --C/w CTX and azithro  --Duonebs Q6H  --Obtain Sputum cx/ MRSA PCR  --Supportive mgt  --Wean off oxygen as tolerated

## 2024-03-01 NOTE — ED PROVIDER NOTE - CLINICAL SUMMARY MEDICAL DECISION MAKING FREE TEXT BOX
84 y/o M PMhx Afib on Nebivolol and Xarelto, DM2 on metformin and Jardiance, Htn, Asthma presents with son at bedside (Portuguese speaking, speaks some english however son assists with history and translation per patient request) c/o SOB. They state about 6 weeks ago he was treated outpatient for pneumonia with 2 weeks of abx (does not recall which abx). His symptoms were improving until this past Sunday 02/25 when his cough became worse so he went to urgent care where he was started on Tamiflu (states had last dose today). Today his shortness of breath became worse and was unable to walk because of it. Took his inhaler at home but son states he does not seem to be using it right. Patient appears very tachypneic and SOB, audible wheezing.   VSS, febrile  On exam patient is tachypneic and using acc muscles   DDX- asthma exacerbation iso flu. Very low suspicion for ACS. Low suspicion for pna but will obtain chest Xray to r/o superimposed pna.

## 2024-03-01 NOTE — H&P ADULT - PROBLEM SELECTOR PLAN 4
--as above --c/w home meds --c/w home meds  --On advair and incruse elipta at home (therapeutic exc spiriva and symbicort)

## 2024-03-01 NOTE — H&P ADULT - TIME BILLING
reviewing laboratory data, consultants' recommendations, documentation in [place], performing medically appropriate examinations/evaluations, discussion with patient/family/RN/ACP/Residents and interdisciplinary staff (such as , social workers, etc), counseling patient/family/care giver, ordering medically appropriate medication, tests, or procedures

## 2024-03-01 NOTE — ED PROVIDER NOTE - ATTENDING CONTRIBUTION TO CARE
I have personally provided the amount of critical care time documented below, excluding time spent on separate procedures: reactive airway disease requiring duonebs/steroids/mag and admission.    I have personally performed a face to face medical and diagnostic evaluation of the patient. I have discussed with and reviewed the Resident's note and agree with the History, ROS, Physical Exam and MDM unless otherwise indicated. A brief summary of my personal evaluation and impression can be found below.    83-year-old male, past medical history of A-fib, diabetes, hypertension, asthma presenting with chief complaint of shortness of breath.  Worse on exertion along with an episode of altered mental status and generalized weakness.  States that 6 weeks ago was treated for pneumonia, symptoms improved after 2 weeks of antibiotics, cough never went away.  Symptoms started worsening again on Sunday, tested positive for flu and started on Tamiflu, last dose was given today.  Patient has been taking his inhaler, but patient's son at bedside is unsure if he has been able to use it well given generalized weakness.  On exam, diffuse wheezes.  Increased work of breathing.  Patient with low-grade temp as well.  Symptoms could be secondary to asthma exacerbation from flu.   Given past medical history we will also assess for possible cardiac cause, but less likely given description of symptoms and exam. Will also assess for possible superimposed bacterial pneumonia.  Plan for sepsis labs.  DuoNebs, steroids.  Patient will likely need magnesium as well.  Patient will likely need admission.  Reassess to dispo.  Will need admission. Dewayne Moreira, ED Attending

## 2024-03-01 NOTE — H&P ADULT - PROBLEM SELECTOR PLAN 3
----Resume home meds. Pt unsure of his medications. Pharmacy closed until 11am  --Will obtain med list and resume meds --c/w home meds  --c/w benazepril (therapeutic exchange lisinopril)

## 2024-03-01 NOTE — ED ADULT NURSE REASSESSMENT NOTE - NS ED NURSE REASSESS COMMENT FT1
patient laying in semi fowlers position on the stretcher. patient denies shortness of breath, chest pain, nausea, vomiting, chill, fever. Patient normal sinus on the monitor. Respirations equal and adequate. Patients IVs patent, no signs of infiltration. Safety measures in place, call bell within reach. Patient stable upon leaving the room. Pt tolerating duo neb well

## 2024-03-02 LAB
A1C WITH ESTIMATED AVERAGE GLUCOSE RESULT: 7.7 % — HIGH (ref 4–5.6)
ANION GAP SERPL CALC-SCNC: 11 MMOL/L — SIGNIFICANT CHANGE UP (ref 7–14)
BUN SERPL-MCNC: 30 MG/DL — HIGH (ref 7–23)
CALCIUM SERPL-MCNC: 8.5 MG/DL — SIGNIFICANT CHANGE UP (ref 8.4–10.5)
CHLORIDE SERPL-SCNC: 102 MMOL/L — SIGNIFICANT CHANGE UP (ref 98–107)
CO2 SERPL-SCNC: 22 MMOL/L — SIGNIFICANT CHANGE UP (ref 22–31)
CREAT SERPL-MCNC: 0.87 MG/DL — SIGNIFICANT CHANGE UP (ref 0.5–1.3)
CULTURE RESULTS: SIGNIFICANT CHANGE UP
EGFR: 85 ML/MIN/1.73M2 — SIGNIFICANT CHANGE UP
ESTIMATED AVERAGE GLUCOSE: 174 — SIGNIFICANT CHANGE UP
GLUCOSE BLDC GLUCOMTR-MCNC: 192 MG/DL — HIGH (ref 70–99)
GLUCOSE BLDC GLUCOMTR-MCNC: 276 MG/DL — HIGH (ref 70–99)
GLUCOSE BLDC GLUCOMTR-MCNC: 326 MG/DL — HIGH (ref 70–99)
GLUCOSE BLDC GLUCOMTR-MCNC: 336 MG/DL — HIGH (ref 70–99)
GLUCOSE SERPL-MCNC: 337 MG/DL — HIGH (ref 70–99)
HCT VFR BLD CALC: 34.8 % — LOW (ref 39–50)
HGB BLD-MCNC: 11.1 G/DL — LOW (ref 13–17)
MAGNESIUM SERPL-MCNC: 2.2 MG/DL — SIGNIFICANT CHANGE UP (ref 1.6–2.6)
MCHC RBC-ENTMCNC: 26.1 PG — LOW (ref 27–34)
MCHC RBC-ENTMCNC: 31.9 GM/DL — LOW (ref 32–36)
MCV RBC AUTO: 81.7 FL — SIGNIFICANT CHANGE UP (ref 80–100)
MRSA PCR RESULT.: SIGNIFICANT CHANGE UP
NRBC # BLD: 0 /100 WBCS — SIGNIFICANT CHANGE UP (ref 0–0)
NRBC # FLD: 0 K/UL — SIGNIFICANT CHANGE UP (ref 0–0)
PHOSPHATE SERPL-MCNC: 2.7 MG/DL — SIGNIFICANT CHANGE UP (ref 2.5–4.5)
PLATELET # BLD AUTO: 102 K/UL — LOW (ref 150–400)
POTASSIUM SERPL-MCNC: 4.6 MMOL/L — SIGNIFICANT CHANGE UP (ref 3.5–5.3)
POTASSIUM SERPL-SCNC: 4.6 MMOL/L — SIGNIFICANT CHANGE UP (ref 3.5–5.3)
RBC # BLD: 4.26 M/UL — SIGNIFICANT CHANGE UP (ref 4.2–5.8)
RBC # FLD: 13.9 % — SIGNIFICANT CHANGE UP (ref 10.3–14.5)
S AUREUS DNA NOSE QL NAA+PROBE: SIGNIFICANT CHANGE UP
SODIUM SERPL-SCNC: 135 MMOL/L — SIGNIFICANT CHANGE UP (ref 135–145)
SPECIMEN SOURCE: SIGNIFICANT CHANGE UP
WBC # BLD: 8.45 K/UL — SIGNIFICANT CHANGE UP (ref 3.8–10.5)
WBC # FLD AUTO: 8.45 K/UL — SIGNIFICANT CHANGE UP (ref 3.8–10.5)

## 2024-03-02 PROCEDURE — 99233 SBSQ HOSP IP/OBS HIGH 50: CPT

## 2024-03-02 RX ORDER — INSULIN GLARGINE 100 [IU]/ML
5 INJECTION, SOLUTION SUBCUTANEOUS AT BEDTIME
Refills: 0 | Status: DISCONTINUED | OUTPATIENT
Start: 2024-03-02 | End: 2024-03-03

## 2024-03-02 RX ORDER — AZITHROMYCIN 500 MG/1
500 TABLET, FILM COATED ORAL EVERY 24 HOURS
Refills: 0 | Status: COMPLETED | OUTPATIENT
Start: 2024-03-03 | End: 2024-03-03

## 2024-03-02 RX ADMIN — Medication 4: at 12:34

## 2024-03-02 RX ADMIN — Medication 3 MILLILITER(S): at 15:07

## 2024-03-02 RX ADMIN — Medication 1 SPRAY(S): at 05:23

## 2024-03-02 RX ADMIN — Medication 600 MILLIGRAM(S): at 05:22

## 2024-03-02 RX ADMIN — Medication 3: at 08:58

## 2024-03-02 RX ADMIN — BUDESONIDE AND FORMOTEROL FUMARATE DIHYDRATE 2 PUFF(S): 160; 4.5 AEROSOL RESPIRATORY (INHALATION) at 22:49

## 2024-03-02 RX ADMIN — INSULIN GLARGINE 5 UNIT(S): 100 INJECTION, SOLUTION SUBCUTANEOUS at 22:28

## 2024-03-02 RX ADMIN — Medication 3 MILLILITER(S): at 08:27

## 2024-03-02 RX ADMIN — NEBIVOLOL HYDROCHLORIDE 2.5 MILLIGRAM(S): 5 TABLET ORAL at 05:22

## 2024-03-02 RX ADMIN — ATORVASTATIN CALCIUM 20 MILLIGRAM(S): 80 TABLET, FILM COATED ORAL at 22:28

## 2024-03-02 RX ADMIN — DRONEDARONE 400 MILLIGRAM(S): 400 TABLET, FILM COATED ORAL at 17:37

## 2024-03-02 RX ADMIN — GABAPENTIN 300 MILLIGRAM(S): 400 CAPSULE ORAL at 22:28

## 2024-03-02 RX ADMIN — Medication 4: at 17:32

## 2024-03-02 RX ADMIN — GABAPENTIN 300 MILLIGRAM(S): 400 CAPSULE ORAL at 05:22

## 2024-03-02 RX ADMIN — Medication 600 MILLIGRAM(S): at 17:37

## 2024-03-02 RX ADMIN — Medication 100 MILLIGRAM(S): at 14:15

## 2024-03-02 RX ADMIN — Medication 3 MILLILITER(S): at 21:31

## 2024-03-02 RX ADMIN — CEFTRIAXONE 100 MILLIGRAM(S): 500 INJECTION, POWDER, FOR SOLUTION INTRAMUSCULAR; INTRAVENOUS at 05:14

## 2024-03-02 RX ADMIN — Medication 100 MILLIGRAM(S): at 05:21

## 2024-03-02 RX ADMIN — Medication 40 MILLIGRAM(S): at 05:21

## 2024-03-02 RX ADMIN — DRONEDARONE 400 MILLIGRAM(S): 400 TABLET, FILM COATED ORAL at 05:23

## 2024-03-02 RX ADMIN — Medication 100 MILLIGRAM(S): at 22:28

## 2024-03-02 RX ADMIN — GABAPENTIN 300 MILLIGRAM(S): 400 CAPSULE ORAL at 14:15

## 2024-03-02 RX ADMIN — RIVAROXABAN 20 MILLIGRAM(S): KIT at 14:16

## 2024-03-02 RX ADMIN — LISINOPRIL 10 MILLIGRAM(S): 2.5 TABLET ORAL at 05:22

## 2024-03-02 RX ADMIN — AZITHROMYCIN 255 MILLIGRAM(S): 500 TABLET, FILM COATED ORAL at 05:55

## 2024-03-02 RX ADMIN — Medication 1 SPRAY(S): at 17:36

## 2024-03-02 RX ADMIN — Medication 3 MILLILITER(S): at 04:35

## 2024-03-03 LAB
ANION GAP SERPL CALC-SCNC: 11 MMOL/L — SIGNIFICANT CHANGE UP (ref 7–14)
BUN SERPL-MCNC: 28 MG/DL — HIGH (ref 7–23)
CALCIUM SERPL-MCNC: 9 MG/DL — SIGNIFICANT CHANGE UP (ref 8.4–10.5)
CHLORIDE SERPL-SCNC: 104 MMOL/L — SIGNIFICANT CHANGE UP (ref 98–107)
CO2 SERPL-SCNC: 23 MMOL/L — SIGNIFICANT CHANGE UP (ref 22–31)
CREAT SERPL-MCNC: 1.01 MG/DL — SIGNIFICANT CHANGE UP (ref 0.5–1.3)
EGFR: 73 ML/MIN/1.73M2 — SIGNIFICANT CHANGE UP
GLUCOSE BLDC GLUCOMTR-MCNC: 238 MG/DL — HIGH (ref 70–99)
GLUCOSE BLDC GLUCOMTR-MCNC: 254 MG/DL — HIGH (ref 70–99)
GLUCOSE BLDC GLUCOMTR-MCNC: 287 MG/DL — HIGH (ref 70–99)
GLUCOSE BLDC GLUCOMTR-MCNC: 359 MG/DL — HIGH (ref 70–99)
GLUCOSE SERPL-MCNC: 242 MG/DL — HIGH (ref 70–99)
HCT VFR BLD CALC: 37.5 % — LOW (ref 39–50)
HGB BLD-MCNC: 11.7 G/DL — LOW (ref 13–17)
MAGNESIUM SERPL-MCNC: 2.1 MG/DL — SIGNIFICANT CHANGE UP (ref 1.6–2.6)
MCHC RBC-ENTMCNC: 25.7 PG — LOW (ref 27–34)
MCHC RBC-ENTMCNC: 31.2 GM/DL — LOW (ref 32–36)
MCV RBC AUTO: 82.4 FL — SIGNIFICANT CHANGE UP (ref 80–100)
NRBC # BLD: 0 /100 WBCS — SIGNIFICANT CHANGE UP (ref 0–0)
NRBC # FLD: 0 K/UL — SIGNIFICANT CHANGE UP (ref 0–0)
PHOSPHATE SERPL-MCNC: 3 MG/DL — SIGNIFICANT CHANGE UP (ref 2.5–4.5)
PLATELET # BLD AUTO: 135 K/UL — LOW (ref 150–400)
POTASSIUM SERPL-MCNC: 4.2 MMOL/L — SIGNIFICANT CHANGE UP (ref 3.5–5.3)
POTASSIUM SERPL-SCNC: 4.2 MMOL/L — SIGNIFICANT CHANGE UP (ref 3.5–5.3)
RBC # BLD: 4.55 M/UL — SIGNIFICANT CHANGE UP (ref 4.2–5.8)
RBC # FLD: 13.8 % — SIGNIFICANT CHANGE UP (ref 10.3–14.5)
SODIUM SERPL-SCNC: 138 MMOL/L — SIGNIFICANT CHANGE UP (ref 135–145)
WBC # BLD: 9.88 K/UL — SIGNIFICANT CHANGE UP (ref 3.8–10.5)
WBC # FLD AUTO: 9.88 K/UL — SIGNIFICANT CHANGE UP (ref 3.8–10.5)

## 2024-03-03 PROCEDURE — 99232 SBSQ HOSP IP/OBS MODERATE 35: CPT

## 2024-03-03 RX ORDER — INFLUENZA VIRUS VACCINE 15; 15; 15; 15 UG/.5ML; UG/.5ML; UG/.5ML; UG/.5ML
0.7 SUSPENSION INTRAMUSCULAR ONCE
Refills: 0 | Status: DISCONTINUED | OUTPATIENT
Start: 2024-03-03 | End: 2024-03-05

## 2024-03-03 RX ORDER — INSULIN GLARGINE 100 [IU]/ML
8 INJECTION, SOLUTION SUBCUTANEOUS AT BEDTIME
Refills: 0 | Status: DISCONTINUED | OUTPATIENT
Start: 2024-03-03 | End: 2024-03-04

## 2024-03-03 RX ADMIN — GABAPENTIN 300 MILLIGRAM(S): 400 CAPSULE ORAL at 05:02

## 2024-03-03 RX ADMIN — NEBIVOLOL HYDROCHLORIDE 2.5 MILLIGRAM(S): 5 TABLET ORAL at 05:03

## 2024-03-03 RX ADMIN — ATORVASTATIN CALCIUM 20 MILLIGRAM(S): 80 TABLET, FILM COATED ORAL at 21:22

## 2024-03-03 RX ADMIN — Medication 100 MILLIGRAM(S): at 21:22

## 2024-03-03 RX ADMIN — Medication 3 MILLILITER(S): at 03:51

## 2024-03-03 RX ADMIN — TIOTROPIUM BROMIDE 2 PUFF(S): 18 CAPSULE ORAL; RESPIRATORY (INHALATION) at 11:31

## 2024-03-03 RX ADMIN — DRONEDARONE 400 MILLIGRAM(S): 400 TABLET, FILM COATED ORAL at 05:04

## 2024-03-03 RX ADMIN — Medication 3 MILLILITER(S): at 08:04

## 2024-03-03 RX ADMIN — BUDESONIDE AND FORMOTEROL FUMARATE DIHYDRATE 2 PUFF(S): 160; 4.5 AEROSOL RESPIRATORY (INHALATION) at 08:41

## 2024-03-03 RX ADMIN — Medication 1 SPRAY(S): at 05:04

## 2024-03-03 RX ADMIN — Medication 3 MILLILITER(S): at 21:43

## 2024-03-03 RX ADMIN — Medication 100 MILLIGRAM(S): at 13:02

## 2024-03-03 RX ADMIN — LISINOPRIL 10 MILLIGRAM(S): 2.5 TABLET ORAL at 05:03

## 2024-03-03 RX ADMIN — Medication 100 MILLIGRAM(S): at 05:03

## 2024-03-03 RX ADMIN — DRONEDARONE 400 MILLIGRAM(S): 400 TABLET, FILM COATED ORAL at 17:10

## 2024-03-03 RX ADMIN — Medication 600 MILLIGRAM(S): at 05:03

## 2024-03-03 RX ADMIN — Medication 5: at 12:19

## 2024-03-03 RX ADMIN — Medication 40 MILLIGRAM(S): at 05:03

## 2024-03-03 RX ADMIN — Medication 3: at 17:13

## 2024-03-03 RX ADMIN — AZITHROMYCIN 255 MILLIGRAM(S): 500 TABLET, FILM COATED ORAL at 08:40

## 2024-03-03 RX ADMIN — Medication 3: at 08:41

## 2024-03-03 RX ADMIN — GABAPENTIN 300 MILLIGRAM(S): 400 CAPSULE ORAL at 21:22

## 2024-03-03 RX ADMIN — BUDESONIDE AND FORMOTEROL FUMARATE DIHYDRATE 2 PUFF(S): 160; 4.5 AEROSOL RESPIRATORY (INHALATION) at 21:21

## 2024-03-03 RX ADMIN — Medication 600 MILLIGRAM(S): at 17:11

## 2024-03-03 RX ADMIN — Medication 1 SPRAY(S): at 17:10

## 2024-03-03 RX ADMIN — RIVAROXABAN 20 MILLIGRAM(S): KIT at 08:41

## 2024-03-03 RX ADMIN — GABAPENTIN 300 MILLIGRAM(S): 400 CAPSULE ORAL at 13:02

## 2024-03-03 RX ADMIN — INSULIN GLARGINE 8 UNIT(S): 100 INJECTION, SOLUTION SUBCUTANEOUS at 22:41

## 2024-03-03 RX ADMIN — CEFTRIAXONE 100 MILLIGRAM(S): 500 INJECTION, POWDER, FOR SOLUTION INTRAMUSCULAR; INTRAVENOUS at 05:03

## 2024-03-03 NOTE — PHYSICAL THERAPY INITIAL EVALUATION ADULT - PERTINENT HX OF CURRENT PROBLEM, REHAB EVAL
Patient is 83 year old Man with a history of Afib, DM2, Htn, Asthma p/w SOB admitted for acute hypoxic respiratory failure likely secondary to asthma exacerbation i/s/o viral infection and  PNA.

## 2024-03-03 NOTE — PATIENT PROFILE ADULT - FALL HARM RISK - HARM RISK INTERVENTIONS

## 2024-03-04 ENCOUNTER — TRANSCRIPTION ENCOUNTER (OUTPATIENT)
Age: 84
End: 2024-03-04

## 2024-03-04 LAB
ANION GAP SERPL CALC-SCNC: 7 MMOL/L — SIGNIFICANT CHANGE UP (ref 7–14)
BUN SERPL-MCNC: 25 MG/DL — HIGH (ref 7–23)
CALCIUM SERPL-MCNC: 9 MG/DL — SIGNIFICANT CHANGE UP (ref 8.4–10.5)
CHLORIDE SERPL-SCNC: 104 MMOL/L — SIGNIFICANT CHANGE UP (ref 98–107)
CO2 SERPL-SCNC: 24 MMOL/L — SIGNIFICANT CHANGE UP (ref 22–31)
CREAT SERPL-MCNC: 0.96 MG/DL — SIGNIFICANT CHANGE UP (ref 0.5–1.3)
EGFR: 77 ML/MIN/1.73M2 — SIGNIFICANT CHANGE UP
GLUCOSE BLDC GLUCOMTR-MCNC: 217 MG/DL — HIGH (ref 70–99)
GLUCOSE BLDC GLUCOMTR-MCNC: 223 MG/DL — HIGH (ref 70–99)
GLUCOSE BLDC GLUCOMTR-MCNC: 322 MG/DL — HIGH (ref 70–99)
GLUCOSE BLDC GLUCOMTR-MCNC: 325 MG/DL — HIGH (ref 70–99)
GLUCOSE SERPL-MCNC: 229 MG/DL — HIGH (ref 70–99)
HCT VFR BLD CALC: 35.9 % — LOW (ref 39–50)
HGB BLD-MCNC: 11.3 G/DL — LOW (ref 13–17)
MAGNESIUM SERPL-MCNC: 1.9 MG/DL — SIGNIFICANT CHANGE UP (ref 1.6–2.6)
MCHC RBC-ENTMCNC: 25.6 PG — LOW (ref 27–34)
MCHC RBC-ENTMCNC: 31.5 GM/DL — LOW (ref 32–36)
MCV RBC AUTO: 81.4 FL — SIGNIFICANT CHANGE UP (ref 80–100)
NRBC # BLD: 0 /100 WBCS — SIGNIFICANT CHANGE UP (ref 0–0)
NRBC # FLD: 0 K/UL — SIGNIFICANT CHANGE UP (ref 0–0)
PHOSPHATE SERPL-MCNC: 3.4 MG/DL — SIGNIFICANT CHANGE UP (ref 2.5–4.5)
PLATELET # BLD AUTO: 130 K/UL — LOW (ref 150–400)
POTASSIUM SERPL-MCNC: 4.2 MMOL/L — SIGNIFICANT CHANGE UP (ref 3.5–5.3)
POTASSIUM SERPL-SCNC: 4.2 MMOL/L — SIGNIFICANT CHANGE UP (ref 3.5–5.3)
RBC # BLD: 4.41 M/UL — SIGNIFICANT CHANGE UP (ref 4.2–5.8)
RBC # FLD: 13.6 % — SIGNIFICANT CHANGE UP (ref 10.3–14.5)
SODIUM SERPL-SCNC: 135 MMOL/L — SIGNIFICANT CHANGE UP (ref 135–145)
WBC # BLD: 7.38 K/UL — SIGNIFICANT CHANGE UP (ref 3.8–10.5)
WBC # FLD AUTO: 7.38 K/UL — SIGNIFICANT CHANGE UP (ref 3.8–10.5)

## 2024-03-04 PROCEDURE — 99232 SBSQ HOSP IP/OBS MODERATE 35: CPT

## 2024-03-04 RX ORDER — INSULIN GLARGINE 100 [IU]/ML
14 INJECTION, SOLUTION SUBCUTANEOUS AT BEDTIME
Refills: 0 | Status: DISCONTINUED | OUTPATIENT
Start: 2024-03-04 | End: 2024-03-05

## 2024-03-04 RX ADMIN — Medication 2: at 09:12

## 2024-03-04 RX ADMIN — Medication 3 MILLILITER(S): at 21:23

## 2024-03-04 RX ADMIN — DRONEDARONE 400 MILLIGRAM(S): 400 TABLET, FILM COATED ORAL at 05:16

## 2024-03-04 RX ADMIN — Medication 4: at 17:23

## 2024-03-04 RX ADMIN — GABAPENTIN 300 MILLIGRAM(S): 400 CAPSULE ORAL at 05:16

## 2024-03-04 RX ADMIN — Medication 3 MILLILITER(S): at 12:49

## 2024-03-04 RX ADMIN — DRONEDARONE 400 MILLIGRAM(S): 400 TABLET, FILM COATED ORAL at 17:06

## 2024-03-04 RX ADMIN — Medication 40 MILLIGRAM(S): at 05:16

## 2024-03-04 RX ADMIN — RIVAROXABAN 20 MILLIGRAM(S): KIT at 08:03

## 2024-03-04 RX ADMIN — TIOTROPIUM BROMIDE 2 PUFF(S): 18 CAPSULE ORAL; RESPIRATORY (INHALATION) at 08:02

## 2024-03-04 RX ADMIN — NEBIVOLOL HYDROCHLORIDE 2.5 MILLIGRAM(S): 5 TABLET ORAL at 05:16

## 2024-03-04 RX ADMIN — GABAPENTIN 300 MILLIGRAM(S): 400 CAPSULE ORAL at 13:09

## 2024-03-04 RX ADMIN — Medication 3 MILLILITER(S): at 16:50

## 2024-03-04 RX ADMIN — CEFTRIAXONE 100 MILLIGRAM(S): 500 INJECTION, POWDER, FOR SOLUTION INTRAMUSCULAR; INTRAVENOUS at 05:43

## 2024-03-04 RX ADMIN — Medication 4: at 12:22

## 2024-03-04 RX ADMIN — Medication 600 MILLIGRAM(S): at 17:06

## 2024-03-04 RX ADMIN — Medication 3 MILLILITER(S): at 04:50

## 2024-03-04 RX ADMIN — LISINOPRIL 10 MILLIGRAM(S): 2.5 TABLET ORAL at 05:16

## 2024-03-04 RX ADMIN — BUDESONIDE AND FORMOTEROL FUMARATE DIHYDRATE 2 PUFF(S): 160; 4.5 AEROSOL RESPIRATORY (INHALATION) at 08:01

## 2024-03-04 RX ADMIN — Medication 100 MILLIGRAM(S): at 05:16

## 2024-03-04 RX ADMIN — Medication 600 MILLIGRAM(S): at 05:16

## 2024-03-04 RX ADMIN — Medication 1 SPRAY(S): at 17:06

## 2024-03-04 RX ADMIN — Medication 100 MILLIGRAM(S): at 21:31

## 2024-03-04 RX ADMIN — ATORVASTATIN CALCIUM 20 MILLIGRAM(S): 80 TABLET, FILM COATED ORAL at 21:30

## 2024-03-04 RX ADMIN — GABAPENTIN 300 MILLIGRAM(S): 400 CAPSULE ORAL at 21:30

## 2024-03-04 RX ADMIN — Medication 100 MILLIGRAM(S): at 13:07

## 2024-03-04 RX ADMIN — INSULIN GLARGINE 14 UNIT(S): 100 INJECTION, SOLUTION SUBCUTANEOUS at 22:37

## 2024-03-04 RX ADMIN — Medication 1 SPRAY(S): at 05:46

## 2024-03-04 RX ADMIN — BUDESONIDE AND FORMOTEROL FUMARATE DIHYDRATE 2 PUFF(S): 160; 4.5 AEROSOL RESPIRATORY (INHALATION) at 21:31

## 2024-03-04 NOTE — DIETITIAN INITIAL EVALUATION ADULT - NS FNS WEIGHT CHANGE REASON
Pt reported his previous weight 160lbs on Monday before admission.  Most recent adm weight 158.15lbs(3/1). Weight trend reflects 2lbs/1.25% weight loss x 3 days./unintentional

## 2024-03-04 NOTE — DIETITIAN INITIAL EVALUATION ADULT - ORAL INTAKE PTA/DIET HISTORY
Pt reports his appetite decreased over the past few days PTA. Endorses some weight loss, but unable to quantify. Pt consumed regular food texture at home, no chewing and swallowing difficulties reported.  Pt reported his previous weight 160lbs on Monday before admission.

## 2024-03-04 NOTE — DIETITIAN INITIAL EVALUATION ADULT - OTHER INFO
Per chart review, 84 y/o Man with a hx  of Afib, DM2, Htn, Asthma p/w SOB admitted for acute hypoxic respiratory failure likely 2/2 asthma exacerbation i/s/o viral infection and  PNA.    Patient seen at bedside. [facility]  utilized ID#[id number]. Reports his appetite has decreased in hospital. As per RN flow sheet, pt noted with 1 PO intake of %. Pt noted currently on easy to chew diet. However due to lack of inventory, minced and moist food texture is being provided instead. Pt dislike current food texture in hospital. Pt is amenable to receive Glucerna 1x daily (220kcal, 10gm pro). Denies chewing and swallowing difficulties in regular food texture PTA. Recommend consider Swallow Eval to possibly advance diet to regular to maximize food choices. Denies any GI distress (nausea/vomiting/diarrhea/constipation.) Last BM 3/2 as per RN Flow sheet. Pt labs notable with elevated POCT ranging from 192-359, A1C 7.7%. Pt noted with steroid in use, possibly steroid induced hyperglycemia. Pt currently on Consistent Carbohydrate diet and on insulin regimen for glycemic control.  RD to remain available for further nutritional interventions as indicated.

## 2024-03-04 NOTE — DISCHARGE NOTE PROVIDER - NSDCMRMEDTOKEN_GEN_ALL_CORE_FT
Advair Diskus 250 mcg-50 mcg inhalation powder: 1 puff(s) inhaled every 12 hours  atorvastatin 20 mg oral tablet: 1 tab(s) orally once a day  benazepril 10 mg oral tablet: 1 tab(s) orally once a day  fluticasone 50 mcg/inh nasal spray: 2 spray(s) in each nostril once a day  gabapentin 300 mg oral capsule: 1 cap(s) orally 3 times a day  Incruse Ellipta 62.5 mcg/inh inhalation powder: 1 inhaler(s) inhaled once a day  Jardiance 10 mg oral tablet: 1 tab(s) orally once a day  metFORMIN 1000 mg oral tablet, extended release: 1 tab(s) orally 2 times a day  Multaq 400 mg oral tablet: 1 tab(s) orally 2 times a day  nebivolol 2.5 mg oral tablet: 1 tab(s) orally once a day  Xarelto 20 mg oral tablet: 1 tab(s) orally once a day   Advair Diskus 250 mcg-50 mcg inhalation powder: 1 puff(s) inhaled every 12 hours  atorvastatin 20 mg oral tablet: 1 tab(s) orally once a day  benazepril 10 mg oral tablet: 1 tab(s) orally once a day  benzonatate 100 mg oral capsule: 1 cap(s) orally 3 times a day  fluticasone 50 mcg/inh nasal spray: 2 spray(s) in each nostril once a day  gabapentin 300 mg oral capsule: 1 cap(s) orally 3 times a day  guaiFENesin 600 mg oral tablet, extended release: 1 tab(s) orally every 12 hours  Incruse Ellipta 62.5 mcg/inh inhalation powder: 1 inhaler(s) inhaled once a day  ipratropium-albuterol 0.5 mg-2.5 mg/3 mL inhalation solution: 3 milliliter(s) inhaled every 6 hours as needed for  shortness of breath and/or wheezing  Jardiance 10 mg oral tablet: 1 tab(s) orally once a day  metFORMIN 1000 mg oral tablet, extended release: 1 tab(s) orally 2 times a day  Multaq 400 mg oral tablet: 1 tab(s) orally 2 times a day  nebivolol 2.5 mg oral tablet: 1 tab(s) orally once a day  Nebulizer machine to use every 6 hrs for treatment: use every 6 hrs for treatment  Xarelto 20 mg oral tablet: 1 tab(s) orally once a day   Advair Diskus 250 mcg-50 mcg inhalation powder: 1 puff(s) inhaled every 12 hours  atorvastatin 20 mg oral tablet: 1 tab(s) orally once a day  benazepril 10 mg oral tablet: 1 tab(s) orally once a day  benzonatate 100 mg oral capsule: 1 cap(s) orally 3 times a day  cefuroxime 250 mg oral tablet: 1 tab(s) orally 2 times a day  fluticasone 50 mcg/inh nasal spray: 2 spray(s) in each nostril once a day  gabapentin 300 mg oral capsule: 1 cap(s) orally 3 times a day  guaiFENesin 600 mg oral tablet, extended release: 1 tab(s) orally every 12 hours  Incruse Ellipta 62.5 mcg/inh inhalation powder: 1 inhaler(s) inhaled once a day  ipratropium-albuterol 0.5 mg-2.5 mg/3 mL inhalation solution: 3 milliliter(s) inhaled every 6 hours as needed for  shortness of breath and/or wheezing  Jardiance 10 mg oral tablet: 1 tab(s) orally once a day  metFORMIN 1000 mg oral tablet, extended release: 1 tab(s) orally 2 times a day  Multaq 400 mg oral tablet: 1 tab(s) orally 2 times a day  nebivolol 2.5 mg oral tablet: 1 tab(s) orally once a day  Nebulizer machine to use every 6 hrs for treatment: use every 6 hours for treatment as needed  predniSONE 20 mg oral tablet: 2 tab(s) orally once a day  Xarelto 20 mg oral tablet: 1 tab(s) orally once a day

## 2024-03-04 NOTE — DIETITIAN INITIAL EVALUATION ADULT - PROBLEM SELECTOR PLAN 1
--likely 2/2 asthma exacerbation i/s/o viral infection +/- PNA  --s/p Tamiflu outpatient for influenza   --C/w CTX and azithro  --Duonebs Q6H  --Obtain Sputum cx/ MRSA PCR  --Supportive mgt  --Wean off oxygen as tolerated

## 2024-03-04 NOTE — PROGRESS NOTE ADULT - PROBLEM SELECTOR PROBLEM 2
Atrial fibrillation
CRITICAL CARE ATTENDING - CTICU    MEDICATIONS  (STANDING):  aspirin enteric coated 81 milliGRAM(s) Oral daily  atorvastatin 40 milliGRAM(s) Oral daily  cefuroxime  IVPB 1500 milliGRAM(s) IV Intermittent every 8 hours  chlorhexidine 0.12% Liquid 5 milliLiter(s) Oral Mucosa two times a day  chlorhexidine 2% Cloths 1 Application(s) Topical <User Schedule>  dextrose 5%. 1000 milliLiter(s) (15 mL/Hr) IV Continuous <Continuous>  dextrose 50% Injectable 50 milliLiter(s) IV Push every 15 minutes  dextrose 50% Injectable 25 milliLiter(s) IV Push every 15 minutes  DOBUTamine Infusion 3 MICROgram(s)/kG/Min (6.94 mL/Hr) IV Continuous <Continuous>  insulin regular Infusion 3 Unit(s)/Hr (3 mL/Hr) IV Continuous <Continuous>  levothyroxine 50 MICROGram(s) Oral daily  metoclopramide Injectable 10 milliGRAM(s) IV Push every 8 hours  norepinephrine Infusion 0.08 MICROgram(s)/kG/Min (11.6 mL/Hr) IV Continuous <Continuous>  pantoprazole  Injectable 40 milliGRAM(s) IV Push daily  polyethylene glycol 3350 17 Gram(s) Oral daily  sodium chloride 0.9%. 1000 milliLiter(s) (10 mL/Hr) IV Continuous <Continuous>                                    7.1    10.05 )-----------( 133      ( 2021 00:26 )             22.3       06-03    149<H>  |  111<H>  |  23  ----------------------------<  180<H>  4.4   |  21<L>  |  1.33<H>    Ca    7.9<L>      2021 00:26  Phos  4.7     06-03  Mg     2.8     06-03    TPro  5.5<L>  /  Alb  3.8  /  TBili  1.8<H>  /  DBili  x   /  AST  37  /  ALT  12  /  AlkPhos  57  06-03      PT/INR - ( 2021 00:26 )   PT: 13.9 sec;   INR: 1.17 ratio         PTT - ( 2021 00:26 )  PTT:35.1 sec          Daily Height in cm: 170.18 (2021 06:57)    Daily Weight in k.2 (2021 00:00)       @ 07:01  -   @ 06:16  --------------------------------------------------------  IN: 4661.3 mL / OUT: 3090 mL / NET: 1571.3 mL        Critically Ill patient  : [ ] preoperative ,   [ x] post operative    Requires :  [x ] Arterial Line   [ x] Central Line  [ ] PA catheter  [ ] IABP  [ ] ECMO  [ ] LVAD  [ ] Ventilator  [ x] PPM [ ] Impella.                      [x ] ABG's     [x ] Pulse Oxymetry Monitoring  Bedside evaluation , monitoring , treatment of hemodynamics , fluids , IVP/ IVCD meds.        Diagnosis:     POD 1 - Re-op bentall, MV annuloplasty     Requires chest PT, pulmonary toilet, suctioning to maintain SaO2,  patent airway and treat atelectasis.     CHF- acute [x ]   chronic [ x]    systolic [x ]   diatolic [ ]          - Echo- EF - 45-50%      [ ] RV dysfunction          - Cxr-cardiomegally, edema          - Clinical-  [x ]inotropes   [ x]pressors   [ ]diuresis   [ ]IABP   [ ]ECMO   [ ]LVAD   [ ]Respiratory Failure.     Hemodynamic lability,  instability. Requires IVCD [ x] vasopressors [x ] inotropes  [ ] vasodilator  [ ]IVSS fluid  to maintain MAP, perfusion, C.I.     PPM     Hypotension     Hypovolemia     IVCD insulin     Hypernatremia     Thrombocytopenia     Requires bedside physical therapy, mobilization and total alf care.         By signing my name below, I, Devora De La Fuente, attest that this documentation has been prepared under the direction and in the presence of Ildefonso Bañuelos MD.   Electronically Signed: Home [facility] 21 @ 06:16      Discussed with CT surgeon, Physician Assistant - Nurse Practitioner- Critical care medicine team.   Discussed at  AM / PM rounds.   Chart, labs , films reviewed.    Cumulative Critical Care Time Given Today: 
CRITICAL CARE ATTENDING - CTICU    MEDICATIONS  (STANDING):  aspirin enteric coated 81 milliGRAM(s) Oral daily  atorvastatin 40 milliGRAM(s) Oral daily  cefuroxime  IVPB 1500 milliGRAM(s) IV Intermittent every 8 hours  chlorhexidine 0.12% Liquid 5 milliLiter(s) Oral Mucosa two times a day  chlorhexidine 2% Cloths 1 Application(s) Topical <User Schedule>  dextrose 5%. 1000 milliLiter(s) (15 mL/Hr) IV Continuous <Continuous>  dextrose 50% Injectable 50 milliLiter(s) IV Push every 15 minutes  dextrose 50% Injectable 25 milliLiter(s) IV Push every 15 minutes  DOBUTamine Infusion 3 MICROgram(s)/kG/Min (6.94 mL/Hr) IV Continuous <Continuous>  insulin regular Infusion 3 Unit(s)/Hr (3 mL/Hr) IV Continuous <Continuous>  levothyroxine 50 MICROGram(s) Oral daily  metoclopramide Injectable 10 milliGRAM(s) IV Push every 8 hours  norepinephrine Infusion 0.08 MICROgram(s)/kG/Min (11.6 mL/Hr) IV Continuous <Continuous>  pantoprazole  Injectable 40 milliGRAM(s) IV Push daily  polyethylene glycol 3350 17 Gram(s) Oral daily  sodium chloride 0.9%. 1000 milliLiter(s) (10 mL/Hr) IV Continuous <Continuous>                                    7.1    10.05 )-----------( 133      ( 2021 00:26 )             22.3       06-03    149<H>  |  111<H>  |  23  ----------------------------<  180<H>  4.4   |  21<L>  |  1.33<H>    Ca    7.9<L>      2021 00:26  Phos  4.7     06-03  Mg     2.8     06-03    TPro  5.5<L>  /  Alb  3.8  /  TBili  1.8<H>  /  DBili  x   /  AST  37  /  ALT  12  /  AlkPhos  57  06-03      PT/INR - ( 2021 00:26 )   PT: 13.9 sec;   INR: 1.17 ratio         PTT - ( 2021 00:26 )  PTT:35.1 sec          Daily Height in cm: 170.18 (2021 06:57)    Daily Weight in k.2 (2021 00:00)       @ 07:01  -   @ 06:16  --------------------------------------------------------  IN: 4661.3 mL / OUT: 3090 mL / NET: 1571.3 mL        Critically Ill patient  : [ ] preoperative ,   [ x] post operative    Requires :  [x ] Arterial Line   [ x] Central Line  [ ] PA catheter  [ ] IABP  [ ] ECMO  [ ] LVAD  [ ] Ventilator  [ x] PPM /AICD  [ ] Impella.                      [x ] ABG's     [x ] Pulse Oxymetry Monitoring  Bedside evaluation , monitoring , treatment of hemodynamics , fluids , IVP/ IVCD meds.        Diagnosis:     POD 1 - Re-op bentall, MV annuloplasty / Ascending Aortic Repair    Requires chest PT, pulmonary toilet, suctioning to maintain SaO2,  patent airway and treat atelectasis.     CHF- acute [x ]   chronic [ x]    systolic [x ]   diatolic [ ]          - Echo- EF - 45-50%      [ ] RV dysfunction          - Cxr-cardiomegally, edema          - Clinical-  [x ]inotropes   [ x]pressors   [ ]diuresis   [ ]IABP   [ ]ECMO   [ ]LVAD   [ ]Respiratory Failure.     Hemodynamic lability,  instability. Requires IVCD [ x] vasopressors [x ] inotropes  [ ] vasodilator  [ ]IVSS fluid  to maintain MAP, perfusion, C.I.     PPM / AICD    Hypotension     Hypovolemia     IVCD insulin     Hypernatremia     Thrombocytopenia     Requires bedside physical therapy, mobilization and total [facility] care.     Chest Tube Drainage     ECG         By signing my name below, I, Devora De La Fuente, attest that this documentation has been prepared under the direction and in the presence of Idlefonso Bañuelos MD.   Electronically Signed: Home Wilburn 21 @ 06:16    IIldefonso, personally performed the services described in this documentation. All medical record entries made by the scribe were at my direction and in my presence. I have reviewed the chart and agree that the record reflects my personal performance and is accurate and complete.   Ildefonso Bañuelos MD.       Discussed with CT surgeon, Physician Assistant - Nurse Practitioner- Critical care medicine team.   Discussed at  AM / PM rounds.   Chart, labs , films reviewed.    Cumulative Critical Care Time Given Today:  30 min
Atrial fibrillation
Atrial fibrillation

## 2024-03-04 NOTE — DISCHARGE NOTE PROVIDER - HOSPITAL COURSE
83M with PMH of chronic Afib on Xarelto, asthma, DM2, HTN presenting with SOB. Pt found to be in AHRF. CXR showed possible RUL infiltrate possibly PNA. Pt started on empiric abx. Also started on nebs + steroids for acute asthma exacerbation in setting of acute viral infection w/ poss superimposed bacterial PNA. Pt clinically improved. He has been weaned off O2. Remains stable on RA. Seen by PT who recs home PT. Med stable for DC. 83M with PMH of chronic Afib on Xarelto, asthma, DM2, HTN presenting with SOB. Pt found to be in AHRF. CXR showed possible RUL infiltrate possibly PNA. Pt started on empiric abx. Also started on nebs + steroids for acute asthma exacerbation in setting of acute viral infection w/ poss superimposed bacterial PNA. Pt clinically improved. He has been weaned off O2. Remains stable on RA. Seen by PT who recs home PT with RW. Family expressed concerns re: ability to navigate stairs. PT re-evaluated pt and did stair assessment. Recommendation stil for home PT. At this point - Med stable for DC. POC reviewed w/ dtr in law. 83M with PMH of chronic Afib on Xarelto, asthma, DM2, HTN presenting with SOB. Pt found to be in AHRF. CXR showed possible RUL infiltrate possibly PNA. Pt started on empiric abx. Also started on nebs + steroids for acute asthma exacerbation in setting of acute viral infection w/ poss superimposed bacterial PNA. Pt clinically improved. He has been weaned off O2. Remains stable on RA. Seen by PT who recs home PT with RW. Family expressed concerns re: ability to navigate stairs. PT re-evaluated pt and did stair assessment. Recommendation stil for OP PT. At this point - Med stable for DC. POC reviewed w/ dtr in law. Pt to be DC home w/  PO abx and prednisone to complete 5d course. Rx for nebulizer including machine to be given as well for use.

## 2024-03-04 NOTE — DIETITIAN INITIAL EVALUATION ADULT - PERTINENT MEDS FT
MEDICATIONS  (STANDING):  albuterol/ipratropium for Nebulization.. 3 milliLiter(s) Nebulizer every 6 hours  atorvastatin 20 milliGRAM(s) Oral at bedtime  benzonatate 100 milliGRAM(s) Oral three times a day  budesonide 160 MICROgram(s)/formoterol 4.5 MICROgram(s) Inhaler 2 Puff(s) Inhalation two times a day  cefTRIAXone   IVPB 1000 milliGRAM(s) IV Intermittent every 24 hours  dextrose 50% Injectable 12.5 Gram(s) IV Push once  dextrose 50% Injectable 25 Gram(s) IV Push once  dextrose 50% Injectable 25 Gram(s) IV Push once  dextrose Oral Gel 15 Gram(s) Oral once  dronedarone 400 milliGRAM(s) Oral two times a day  fluticasone propionate 50 MICROgram(s)/spray Nasal Spray 1 Spray(s) Both Nostrils two times a day  gabapentin 300 milliGRAM(s) Oral three times a day  glucagon  Injectable 1 milliGRAM(s) IntraMuscular once  guaiFENesin  milliGRAM(s) Oral every 12 hours  influenza  Vaccine (HIGH DOSE) 0.7 milliLiter(s) IntraMuscular once  insulin glargine Injectable (LANTUS) 8 Unit(s) SubCutaneous at bedtime  insulin lispro (ADMELOG) corrective regimen sliding scale   SubCutaneous three times a day before meals  insulin lispro (ADMELOG) corrective regimen sliding scale   SubCutaneous at bedtime  lisinopril 10 milliGRAM(s) Oral daily  nebivolol 2.5 milliGRAM(s) Oral daily  predniSONE   Tablet 40 milliGRAM(s) Oral daily  rivaroxaban 20 milliGRAM(s) Oral daily  tiotropium 2.5 MICROgram(s) Inhaler 2 Puff(s) Inhalation daily    MEDICATIONS  (PRN):

## 2024-03-04 NOTE — PROGRESS NOTE ADULT - PROBLEM SELECTOR PLAN 3
--c/w home meds  --c/w benazepril (therapeutic exchange lisinopril)

## 2024-03-04 NOTE — PROGRESS NOTE ADULT - PROBLEM SELECTOR PLAN 5
DIET: ETC diet  DISPO: PT recommends home PT - repeat eval appreciated. Stair assessment done. Still rec for home PT. RW advised. F/u CM.     If able to coordinate DME - can be DC home w/ family. POC discussed w/ Jayda (daughter in law).    35 min spent preparing DC, counseling pt, and coordination of care.
DIET: ETC diet  DISPO: PT recommends home PT
DIET: ETC diet  DISPO: Pending hospital course

## 2024-03-04 NOTE — PROGRESS NOTE ADULT - PROBLEM SELECTOR PLAN 2
- Chronic atrial fib.  --c/w home meds - multaq, BB.  --continue with DOAC - Chronic atrial fib.  --c/w home meds - multaq, BB.  --continue with DOAC    #Dm2  -Start lantus. ISS.  -FS checks per protocol. LIkely exacerbated by steroid use.  -Home PO meds on hold. Resume on DC.

## 2024-03-04 NOTE — DISCHARGE NOTE PROVIDER - ATTENDING DISCHARGE PHYSICAL EXAMINATION:
Pt seen and evaluated at bedside this AM. No o/n events. Denies any SOB/CP/NV. Requested repeat CXR. Explained to pt that it is too soon at this point. Considering improvement - lower utility to repeat at this time.    Gen- In bed, comfortable, NAD  Resp- CTAB, good effort.  CVS- RRR, S1S2, no g/r/m.  GI- Soft abd, nT, ND, +BSx4  Ext- No C/C.   Neuro- CN II-XII intact. Speech fluent/face symmetric.  Pt seen and evaluated at bedside this AM. no o/n events. Feels well. No complaints.    Gen- In bed, comfortable, nAD. Speaking full sentences  Resp- Scattered wheeze. Godo air entry bilaterally. Normal effort.  CVS- RRR, S1S2, no g/r/m.  GI- Soft abd, NT,ND, +BSx4  Ext- No C/C.   Neuro- CN II-XII intact. Speech fluent/face symmetric.

## 2024-03-04 NOTE — PROGRESS NOTE ADULT - SUBJECTIVE AND OBJECTIVE BOX
PROGRESS NOTE:     Patient is a 83y old  Male who presents with a chief complaint of AHRF (01 Mar 2024 10:14)      SUBJECTIVE / OVERNIGHT EVENTS: feeling better today    ADDITIONAL REVIEW OF SYSTEMS:    MEDICATIONS  (STANDING):  albuterol/ipratropium for Nebulization.. 3 milliLiter(s) Nebulizer every 6 hours  atorvastatin 20 milliGRAM(s) Oral at bedtime  azithromycin  IVPB 500 milliGRAM(s) IV Intermittent every 24 hours  benzonatate 100 milliGRAM(s) Oral three times a day  budesonide 160 MICROgram(s)/formoterol 4.5 MICROgram(s) Inhaler 2 Puff(s) Inhalation two times a day  cefTRIAXone   IVPB 1000 milliGRAM(s) IV Intermittent every 24 hours  dextrose 50% Injectable 12.5 Gram(s) IV Push once  dextrose 50% Injectable 25 Gram(s) IV Push once  dextrose 50% Injectable 25 Gram(s) IV Push once  dextrose Oral Gel 15 Gram(s) Oral once  dronedarone 400 milliGRAM(s) Oral two times a day  fluticasone propionate 50 MICROgram(s)/spray Nasal Spray 1 Spray(s) Both Nostrils two times a day  gabapentin 300 milliGRAM(s) Oral three times a day  glucagon  Injectable 1 milliGRAM(s) IntraMuscular once  guaiFENesin  milliGRAM(s) Oral every 12 hours  insulin glargine Injectable (LANTUS) 5 Unit(s) SubCutaneous at bedtime  insulin lispro (ADMELOG) corrective regimen sliding scale   SubCutaneous three times a day before meals  insulin lispro (ADMELOG) corrective regimen sliding scale   SubCutaneous at bedtime  lisinopril 10 milliGRAM(s) Oral daily  nebivolol 2.5 milliGRAM(s) Oral daily  predniSONE   Tablet 40 milliGRAM(s) Oral daily  rivaroxaban 20 milliGRAM(s) Oral daily  tiotropium 2.5 MICROgram(s) Inhaler 2 Puff(s) Inhalation daily    MEDICATIONS  (PRN):      CAPILLARY BLOOD GLUCOSE      POCT Blood Glucose.: 336 mg/dL (02 Mar 2024 12:16)  POCT Blood Glucose.: 276 mg/dL (02 Mar 2024 08:38)  POCT Blood Glucose.: 233 mg/dL (01 Mar 2024 22:12)  POCT Blood Glucose.: 249 mg/dL (01 Mar 2024 21:10)  POCT Blood Glucose.: 192 mg/dL (01 Mar 2024 18:30)  POCT Blood Glucose.: 216 mg/dL (01 Mar 2024 17:13)    I&O's Summary      PHYSICAL EXAM:  Vital Signs Last 24 Hrs  T(C): 36.4 (02 Mar 2024 14:02), Max: 36.7 (01 Mar 2024 21:33)  T(F): 97.6 (02 Mar 2024 14:02), Max: 98 (01 Mar 2024 21:33)  HR: 75 (02 Mar 2024 14:02) (73 - 88)  BP: 151/73 (02 Mar 2024 14:02) (138/78 - 158/97)  BP(mean): --  RR: 18 (02 Mar 2024 14:02) (17 - 19)  SpO2: 95% (02 Mar 2024 14:02) (95% - 100%)    Parameters below as of 02 Mar 2024 14:02  Patient On (Oxygen Delivery Method): room air        CONSTITUTIONAL: NAD, well-developed  RESPIRATORY: diffuse wheezing bilaterally  CARDIOVASCULAR: Regular rate and rhythm, normal S1 and S2, no murmur/rub/gallop; No lower extremity edema;   ABDOMEN: Nontender to palpation, normoactive bowel sounds, no rebound/guarding; No hepatosplenomegaly  MUSCLOSKELETAL: no clubbing or cyanosis of digits; no joint swelling or tenderness to palpation  PSYCH: A+O to person, place, and time; affect appropriate    LABS:                        11.1   8.45  )-----------( 102      ( 02 Mar 2024 06:36 )             34.8     03-02    135  |  102  |  30<H>  ----------------------------<  337<H>  4.6   |  22  |  0.87    Ca    8.5      02 Mar 2024 06:36  Phos  2.7     03-02  Mg     2.20     03-02    TPro  7.5  /  Alb  4.0  /  TBili  0.5  /  DBili  x   /  AST  31  /  ALT  18  /  AlkPhos  89  02-29    PT/INR - ( 29 Feb 2024 23:20 )   PT: 12.0 sec;   INR: 1.06 ratio         PTT - ( 29 Feb 2024 23:20 )  PTT:34.6 sec      Urinalysis Basic - ( 02 Mar 2024 06:36 )    Color: x / Appearance: x / SG: x / pH: x  Gluc: 337 mg/dL / Ketone: x  / Bili: x / Urobili: x   Blood: x / Protein: x / Nitrite: x   Leuk Esterase: x / RBC: x / WBC x   Sq Epi: x / Non Sq Epi: x / Bacteria: x        Culture - Urine (collected 01 Mar 2024 07:15)  Source: Clean Catch Clean Catch (Midstream)  Final Report (02 Mar 2024 07:08):    <10,000 CFU/mL Normal Urogenital Lina    Culture - Blood (collected 29 Feb 2024 23:20)  Source: .Blood Blood-Peripheral  Preliminary Report (02 Mar 2024 03:02):    No growth at 24 hours    Culture - Blood (collected 29 Feb 2024 23:10)  Source: .Blood Blood-Peripheral  Preliminary Report (02 Mar 2024 03:02):    No growth at 24 hours        RADIOLOGY & ADDITIONAL TESTS:  Results Reviewed:   Imaging Personally Reviewed:  Electrocardiogram Personally Reviewed:    COORDINATION OF CARE:  Care Discussed with Consultants/Other Providers [Y/N]:  Prior or Outpatient Records Reviewed [Y/N]:  
PROGRESS NOTE:     Patient is a 83y old  Male who presents with a chief complaint of AHRF (02 Mar 2024 15:58)      SUBJECTIVE / OVERNIGHT EVENTS: feeling well, no acute events overnight    ADDITIONAL REVIEW OF SYSTEMS:    MEDICATIONS  (STANDING):  albuterol/ipratropium for Nebulization.. 3 milliLiter(s) Nebulizer every 6 hours  atorvastatin 20 milliGRAM(s) Oral at bedtime  benzonatate 100 milliGRAM(s) Oral three times a day  budesonide 160 MICROgram(s)/formoterol 4.5 MICROgram(s) Inhaler 2 Puff(s) Inhalation two times a day  cefTRIAXone   IVPB 1000 milliGRAM(s) IV Intermittent every 24 hours  dextrose 50% Injectable 12.5 Gram(s) IV Push once  dextrose 50% Injectable 25 Gram(s) IV Push once  dextrose 50% Injectable 25 Gram(s) IV Push once  dextrose Oral Gel 15 Gram(s) Oral once  dronedarone 400 milliGRAM(s) Oral two times a day  fluticasone propionate 50 MICROgram(s)/spray Nasal Spray 1 Spray(s) Both Nostrils two times a day  gabapentin 300 milliGRAM(s) Oral three times a day  glucagon  Injectable 1 milliGRAM(s) IntraMuscular once  guaiFENesin  milliGRAM(s) Oral every 12 hours  insulin glargine Injectable (LANTUS) 8 Unit(s) SubCutaneous at bedtime  insulin lispro (ADMELOG) corrective regimen sliding scale   SubCutaneous three times a day before meals  insulin lispro (ADMELOG) corrective regimen sliding scale   SubCutaneous at bedtime  lisinopril 10 milliGRAM(s) Oral daily  nebivolol 2.5 milliGRAM(s) Oral daily  predniSONE   Tablet 40 milliGRAM(s) Oral daily  rivaroxaban 20 milliGRAM(s) Oral daily  tiotropium 2.5 MICROgram(s) Inhaler 2 Puff(s) Inhalation daily    MEDICATIONS  (PRN):      CAPILLARY BLOOD GLUCOSE      POCT Blood Glucose.: 287 mg/dL (03 Mar 2024 17:10)  POCT Blood Glucose.: 359 mg/dL (03 Mar 2024 12:17)  POCT Blood Glucose.: 254 mg/dL (03 Mar 2024 08:28)  POCT Blood Glucose.: 192 mg/dL (02 Mar 2024 22:20)    I&O's Summary      PHYSICAL EXAM:  Vital Signs Last 24 Hrs  T(C): 36.6 (03 Mar 2024 12:47), Max: 36.9 (02 Mar 2024 21:39)  T(F): 97.9 (03 Mar 2024 12:47), Max: 98.4 (02 Mar 2024 21:39)  HR: 103 (03 Mar 2024 12:47) (75 - 103)  BP: 145/87 (03 Mar 2024 12:47) (121/74 - 145/87)  BP(mean): --  RR: 18 (03 Mar 2024 12:47) (17 - 18)  SpO2: 96% (03 Mar 2024 12:47) (96% - 98%)    Parameters below as of 03 Mar 2024 12:47  Patient On (Oxygen Delivery Method): room air        CONSTITUTIONAL: NAD, well-developed  RESPIRATORY: coarse bs b/l  CARDIOVASCULAR:+s1s2  ABDOMEN: Nontender to palpation, normoactive bowel sounds, no rebound/guarding; No hepatosplenomegaly  MUSCLOSKELETAL: no clubbing or cyanosis of digits; no joint swelling or tenderness to palpation  PSYCH: A+O to person, place, and time; affect appropriate    LABS:                        11.7   9.88  )-----------( 135      ( 03 Mar 2024 09:17 )             37.5     03-03    138  |  104  |  28<H>  ----------------------------<  242<H>  4.2   |  23  |  1.01    Ca    9.0      03 Mar 2024 09:17  Phos  3.0     03-03  Mg     2.10     03-03            Urinalysis Basic - ( 03 Mar 2024 09:17 )    Color: x / Appearance: x / SG: x / pH: x  Gluc: 242 mg/dL / Ketone: x  / Bili: x / Urobili: x   Blood: x / Protein: x / Nitrite: x   Leuk Esterase: x / RBC: x / WBC x   Sq Epi: x / Non Sq Epi: x / Bacteria: x        Culture - Urine (collected 01 Mar 2024 07:15)  Source: Clean Catch Clean Catch (Midstream)  Final Report (02 Mar 2024 07:08):    <10,000 CFU/mL Normal Urogenital Lina    Culture - Blood (collected 29 Feb 2024 23:20)  Source: .Blood Blood-Peripheral  Preliminary Report (03 Mar 2024 03:01):    No growth at 48 Hours    Culture - Blood (collected 29 Feb 2024 23:10)  Source: .Blood Blood-Peripheral  Preliminary Report (03 Mar 2024 03:01):    No growth at 48 Hours        RADIOLOGY & ADDITIONAL TESTS:  Results Reviewed:   Imaging Personally Reviewed:  Electrocardiogram Personally Reviewed:    COORDINATION OF CARE:  Care Discussed with Consultants/Other Providers [Y/N]:  Prior or Outpatient Records Reviewed [Y/N]:  
[facility] Division of Hospital Medicine  Darius TaborNino) MD Lalit  Pager [phone]    SUBJECTIVE:  Chief complaint: Asthma exacerbation.    Pt seen and eval at bedside. no o/n events. Feels well. SOB better.       ROS: All systems negative except as noted.      Vital Signs Last 24 Hrs  T(C): 36.3 (04 Mar 2024 12:48), Max: 36.8 (04 Mar 2024 05:16)  T(F): 97.3 (04 Mar 2024 12:48), Max: 98.3 (04 Mar 2024 05:16)  HR: 70 (04 Mar 2024 12:48) (67 - 86)  BP: 146/83 (04 Mar 2024 12:48) (133/75 - 146/83)  BP(mean): --  RR: 18 (04 Mar 2024 12:48) (17 - 18)  SpO2: 95% (04 Mar 2024 12:48) (95% - 99%)    Parameters below as of 04 Mar 2024 12:48  Patient On (Oxygen Delivery Method): room air      PHYSICAL EXAM:  Gen- In chair, NAD. Speaking full sentences  Resp- Normal effort. Scattered wheezes. Otherwise good air entry throughout.   CVS- RRR, S1S2, no g/r/m.  GI- Soft abd, nT, ND, +BSx4  Ext- No C/C.   Neuro- CN II-XII intact. SPeech fluent/face symmetric.      MEDICATION:  MEDICATIONS  (STANDING):  albuterol/ipratropium for Nebulization.. 3 milliLiter(s) Nebulizer every 6 hours  atorvastatin 20 milliGRAM(s) Oral at bedtime  benzonatate 100 milliGRAM(s) Oral three times a day  budesonide 160 MICROgram(s)/formoterol 4.5 MICROgram(s) Inhaler 2 Puff(s) Inhalation two times a day  cefTRIAXone   IVPB 1000 milliGRAM(s) IV Intermittent every 24 hours  dextrose 50% Injectable 25 Gram(s) IV Push once  dextrose 50% Injectable 12.5 Gram(s) IV Push once  dextrose 50% Injectable 25 Gram(s) IV Push once  dextrose Oral Gel 15 Gram(s) Oral once  dronedarone 400 milliGRAM(s) Oral two times a day  fluticasone propionate 50 MICROgram(s)/spray Nasal Spray 1 Spray(s) Both Nostrils two times a day  gabapentin 300 milliGRAM(s) Oral three times a day  glucagon  Injectable 1 milliGRAM(s) IntraMuscular once  guaiFENesin  milliGRAM(s) Oral every 12 hours  influenza  Vaccine (HIGH DOSE) 0.7 milliLiter(s) IntraMuscular once  insulin glargine Injectable (LANTUS) 8 Unit(s) SubCutaneous at bedtime  insulin lispro (ADMELOG) corrective regimen sliding scale   SubCutaneous three times a day before meals  insulin lispro (ADMELOG) corrective regimen sliding scale   SubCutaneous at bedtime  lisinopril 10 milliGRAM(s) Oral daily  nebivolol 2.5 milliGRAM(s) Oral daily  predniSONE   Tablet 40 milliGRAM(s) Oral daily  rivaroxaban 20 milliGRAM(s) Oral daily  tiotropium 2.5 MICROgram(s) Inhaler 2 Puff(s) Inhalation daily    MEDICATIONS  (PRN):            LABORATORY:                          11.3   7.38  )-----------( 130      ( 04 Mar 2024 05:35 )             35.9     03-04    135  |  104  |  25<H>  ----------------------------<  229<H>  4.2   |  24  |  0.96    Ca    9.0      04 Mar 2024 05:35  Phos  3.4     03-04  Mg     1.90     03-04        Urinalysis Basic - ( 04 Mar 2024 05:35 )    Color: x / Appearance: x / SG: x / pH: x  Gluc: 229 mg/dL / Ketone: x  / Bili: x / Urobili: x   Blood: x / Protein: x / Nitrite: x   Leuk Esterase: x / RBC: x / WBC x   Sq Epi: x / Non Sq Epi: x / Bacteria: x

## 2024-03-04 NOTE — DIETITIAN INITIAL EVALUATION ADULT - ADD RECOMMEND
1) Recommend consider add SLP consult for Swallow eval as pt dislike current diet texture.   2) Recommend add Glucerna 2x daily (220kcal, 10gm pro).   3) Recommend consider Endo consult to monitor and adjust insulin regimen.   4) Monitor PO intake, Labs, weights, BMs, and skin integrity.   5) RD to remain available for further nutritional interventions as indicated.

## 2024-03-04 NOTE — DIETITIAN INITIAL EVALUATION ADULT - PERTINENT LABORATORY DATA
03-04    135  |  104  |  25<H>  ----------------------------<  229<H>  4.2   |  24  |  0.96    Ca    9.0      04 Mar 2024 05:35  Phos  3.4     03-04  Mg     1.90     03-04    POCT Blood Glucose.: 325 mg/dL (03-04-24 @ 12:15)  A1C with Estimated Average Glucose Result: 7.7 % (03-02-24 @ 06:36)

## 2024-03-04 NOTE — PROGRESS NOTE ADULT - PROBLEM SELECTOR PLAN 1
--likely 2/2 asthma exacerbation i/s/o viral infection + PNA  --s/p Tamiflu outpatient for influenza   --continue steroids - plan for pred 40 qday x7d total.  --C/w CTX alone (mycoplasma, chlamydia and legionella negative)  --Duonebs Q6H -> will rx med w/ machine on DC considering hx of asthma   --Supportive mgt  --stable off oxygen
--likely 2/2 asthma exacerbation i/s/o viral infection + PNA  --s/p Tamiflu outpatient for influenza   --continue steroids  --C/w CTX alone (mycoplasma, chlamydia and legionella negative)  --Duonebs Q6H  --Supportive mgt  --stable off oxygen
--likely 2/2 asthma exacerbation i/s/o viral infection + PNA  --s/p Tamiflu outpatient for influenza   --continue steroids  --C/w CTX alone (mycoplasma, chlamydia and legionella negative)  --Duonebs Q6H  --Supportive mgt  --stable off oxygen

## 2024-03-04 NOTE — DISCHARGE NOTE PROVIDER - CARE PROVIDER_API CALL
Garrett Seaman  Cardiovascular Disease  [address] [place] [address]  Phone: [phone]  Fax: [phone]  Established Patient  Follow Up Time: 1 week

## 2024-03-04 NOTE — DIETITIAN INITIAL EVALUATION ADULT - NS FNS DIET ORDER
Diet, Easy to Chew:   Consistent Carbohydrate {Evening Snack} (CSTCHOSN)  DASH/TLC {Sodium & Cholesterol Restricted} (DASH) (03-01-24 @ 09:42) [Active]

## 2024-03-04 NOTE — DIETITIAN INITIAL EVALUATION ADULT - OBTAIN CURRENT WEIGHT
Patient calling and stated that he was at ER last night due to CP, now with middle underneath muscle where rib cage meet, pain 3/10, \"dull,cramping and pressure pain\", no N/V, no abdominal distention,  Had BM today, ate yogurt earlier.  OV made for tomorr yes

## 2024-03-04 NOTE — PROGRESS NOTE ADULT - PROBLEM SELECTOR PLAN 4
--c/w home meds  --On advair and incruse elipta at home (therapeutic exc spiriva and symbicort)  - Rx duoneb and neb machine on Rx.
--c/w home meds  --On advair and incruse elipta at home (therapeutic exc spiriva and symbicort)
--c/w home meds  --On advair and incruse elipta at home (therapeutic exc spiriva and symbicort)

## 2024-03-04 NOTE — PROGRESS NOTE ADULT - ASSESSMENT
84 y/o Man with a hx  of Afib, DM2, Htn, Asthma p/w SOB admitted for acute hypoxic respiratory failure likely 2/2 asthma exacerbation i/s/o viral infection and  PNA.  
82 y/o Man with a hx  of Afib, DM2, Htn, Asthma p/w SOB admitted for acute hypoxic respiratory failure likely 2/2 asthma exacerbation i/s/o viral infection and  PNA.  
82 y/o Man with a hx  of Afib, DM2, Htn, Asthma p/w SOB admitted for acute hypoxic respiratory failure likely 2/2 asthma exacerbation i/s/o viral infection and  PNA.

## 2024-03-05 ENCOUNTER — TRANSCRIPTION ENCOUNTER (OUTPATIENT)
Age: 84
End: 2024-03-05

## 2024-03-05 VITALS
DIASTOLIC BLOOD PRESSURE: 80 MMHG | OXYGEN SATURATION: 98 % | HEART RATE: 81 BPM | RESPIRATION RATE: 18 BRPM | SYSTOLIC BLOOD PRESSURE: 155 MMHG | TEMPERATURE: 98 F

## 2024-03-05 LAB
ANION GAP SERPL CALC-SCNC: 14 MMOL/L — SIGNIFICANT CHANGE UP (ref 7–14)
BUN SERPL-MCNC: 24 MG/DL — HIGH (ref 7–23)
CALCIUM SERPL-MCNC: 9.3 MG/DL — SIGNIFICANT CHANGE UP (ref 8.4–10.5)
CHLORIDE SERPL-SCNC: 102 MMOL/L — SIGNIFICANT CHANGE UP (ref 98–107)
CO2 SERPL-SCNC: 20 MMOL/L — LOW (ref 22–31)
CREAT SERPL-MCNC: 0.99 MG/DL — SIGNIFICANT CHANGE UP (ref 0.5–1.3)
EGFR: 75 ML/MIN/1.73M2 — SIGNIFICANT CHANGE UP
GLUCOSE BLDC GLUCOMTR-MCNC: 211 MG/DL — HIGH (ref 70–99)
GLUCOSE BLDC GLUCOMTR-MCNC: 222 MG/DL — HIGH (ref 70–99)
GLUCOSE BLDC GLUCOMTR-MCNC: 393 MG/DL — HIGH (ref 70–99)
GLUCOSE SERPL-MCNC: 195 MG/DL — HIGH (ref 70–99)
HCT VFR BLD CALC: 36.4 % — LOW (ref 39–50)
HGB BLD-MCNC: 11.3 G/DL — LOW (ref 13–17)
MAGNESIUM SERPL-MCNC: 2 MG/DL — SIGNIFICANT CHANGE UP (ref 1.6–2.6)
MCHC RBC-ENTMCNC: 25.3 PG — LOW (ref 27–34)
MCHC RBC-ENTMCNC: 31 GM/DL — LOW (ref 32–36)
MCV RBC AUTO: 81.4 FL — SIGNIFICANT CHANGE UP (ref 80–100)
NRBC # BLD: 0 /100 WBCS — SIGNIFICANT CHANGE UP (ref 0–0)
NRBC # FLD: 0 K/UL — SIGNIFICANT CHANGE UP (ref 0–0)
PHOSPHATE SERPL-MCNC: 3.9 MG/DL — SIGNIFICANT CHANGE UP (ref 2.5–4.5)
PLATELET # BLD AUTO: 141 K/UL — LOW (ref 150–400)
POTASSIUM SERPL-MCNC: 4.6 MMOL/L — SIGNIFICANT CHANGE UP (ref 3.5–5.3)
POTASSIUM SERPL-SCNC: 4.6 MMOL/L — SIGNIFICANT CHANGE UP (ref 3.5–5.3)
RBC # BLD: 4.47 M/UL — SIGNIFICANT CHANGE UP (ref 4.2–5.8)
RBC # FLD: 13.6 % — SIGNIFICANT CHANGE UP (ref 10.3–14.5)
SODIUM SERPL-SCNC: 136 MMOL/L — SIGNIFICANT CHANGE UP (ref 135–145)
WBC # BLD: 6.69 K/UL — SIGNIFICANT CHANGE UP (ref 3.8–10.5)
WBC # FLD AUTO: 6.69 K/UL — SIGNIFICANT CHANGE UP (ref 3.8–10.5)

## 2024-03-05 PROCEDURE — 99239 HOSP IP/OBS DSCHRG MGMT >30: CPT

## 2024-03-05 RX ORDER — FLUTICASONE PROPIONATE AND SALMETEROL 50; 250 UG/1; UG/1
1 POWDER ORAL; RESPIRATORY (INHALATION)
Refills: 0 | DISCHARGE

## 2024-03-05 RX ORDER — FLUTICASONE PROPIONATE 50 MCG
2 SPRAY, SUSPENSION NASAL
Refills: 0 | DISCHARGE

## 2024-03-05 RX ORDER — FLUTICASONE PROPIONATE 50 MCG
2 SPRAY, SUSPENSION NASAL
Qty: 1 | Refills: 0
Start: 2024-03-05 | End: 2024-04-03

## 2024-03-05 RX ORDER — EMPAGLIFLOZIN 10 MG/1
1 TABLET, FILM COATED ORAL
Qty: 30 | Refills: 0
Start: 2024-03-05 | End: 2024-04-03

## 2024-03-05 RX ORDER — EMPAGLIFLOZIN 10 MG/1
1 TABLET, FILM COATED ORAL
Refills: 0 | DISCHARGE

## 2024-03-05 RX ORDER — METFORMIN HYDROCHLORIDE 850 MG/1
1 TABLET ORAL
Refills: 0 | DISCHARGE

## 2024-03-05 RX ORDER — METFORMIN HYDROCHLORIDE 850 MG/1
1 TABLET ORAL
Qty: 60 | Refills: 0
Start: 2024-03-05 | End: 2024-04-03

## 2024-03-05 RX ORDER — CEFUROXIME AXETIL 250 MG
1 TABLET ORAL
Qty: 4 | Refills: 0
Start: 2024-03-05 | End: 2024-03-06

## 2024-03-05 RX ORDER — FLUTICASONE PROPIONATE AND SALMETEROL 50; 250 UG/1; UG/1
1 POWDER ORAL; RESPIRATORY (INHALATION)
Qty: 1 | Refills: 0
Start: 2024-03-05 | End: 2024-04-03

## 2024-03-05 RX ORDER — INSULIN LISPRO 100/ML
3 VIAL (ML) SUBCUTANEOUS ONCE
Refills: 0 | Status: COMPLETED | OUTPATIENT
Start: 2024-03-05 | End: 2024-03-05

## 2024-03-05 RX ORDER — IPRATROPIUM/ALBUTEROL SULFATE 18-103MCG
3 AEROSOL WITH ADAPTER (GRAM) INHALATION
Qty: 360 | Refills: 0
Start: 2024-03-05 | End: 2024-04-03

## 2024-03-05 RX ADMIN — DRONEDARONE 400 MILLIGRAM(S): 400 TABLET, FILM COATED ORAL at 17:45

## 2024-03-05 RX ADMIN — TIOTROPIUM BROMIDE 2 PUFF(S): 18 CAPSULE ORAL; RESPIRATORY (INHALATION) at 09:04

## 2024-03-05 RX ADMIN — Medication 100 MILLIGRAM(S): at 13:38

## 2024-03-05 RX ADMIN — Medication 3 MILLILITER(S): at 15:58

## 2024-03-05 RX ADMIN — Medication 40 MILLIGRAM(S): at 05:17

## 2024-03-05 RX ADMIN — DRONEDARONE 400 MILLIGRAM(S): 400 TABLET, FILM COATED ORAL at 05:17

## 2024-03-05 RX ADMIN — BUDESONIDE AND FORMOTEROL FUMARATE DIHYDRATE 2 PUFF(S): 160; 4.5 AEROSOL RESPIRATORY (INHALATION) at 08:34

## 2024-03-05 RX ADMIN — CEFTRIAXONE 100 MILLIGRAM(S): 500 INJECTION, POWDER, FOR SOLUTION INTRAMUSCULAR; INTRAVENOUS at 05:18

## 2024-03-05 RX ADMIN — Medication 600 MILLIGRAM(S): at 05:18

## 2024-03-05 RX ADMIN — Medication 3 MILLILITER(S): at 03:48

## 2024-03-05 RX ADMIN — LISINOPRIL 10 MILLIGRAM(S): 2.5 TABLET ORAL at 05:18

## 2024-03-05 RX ADMIN — Medication 5: at 12:41

## 2024-03-05 RX ADMIN — Medication 2: at 08:39

## 2024-03-05 RX ADMIN — GABAPENTIN 300 MILLIGRAM(S): 400 CAPSULE ORAL at 05:17

## 2024-03-05 RX ADMIN — Medication 600 MILLIGRAM(S): at 17:44

## 2024-03-05 RX ADMIN — Medication 1 SPRAY(S): at 05:23

## 2024-03-05 RX ADMIN — Medication 1 SPRAY(S): at 17:45

## 2024-03-05 RX ADMIN — GABAPENTIN 300 MILLIGRAM(S): 400 CAPSULE ORAL at 17:42

## 2024-03-05 RX ADMIN — NEBIVOLOL HYDROCHLORIDE 2.5 MILLIGRAM(S): 5 TABLET ORAL at 05:18

## 2024-03-05 RX ADMIN — Medication 100 MILLIGRAM(S): at 05:17

## 2024-03-05 RX ADMIN — Medication 3 UNIT(S): at 13:15

## 2024-03-05 RX ADMIN — Medication 2: at 17:43

## 2024-03-05 RX ADMIN — Medication 3 MILLILITER(S): at 11:28

## 2024-03-05 RX ADMIN — RIVAROXABAN 20 MILLIGRAM(S): KIT at 12:35

## 2024-03-05 NOTE — SWALLOW BEDSIDE ASSESSMENT ADULT - COMMENTS
Per charting, "84 y/o Man with a hx  of Afib, DM2, Htn, Asthma p/w SOB admitted for acute hypoxic respiratory failure likely 2/2 asthma exacerbation i/s/o viral infection and  PNA."    CXR 3/1/24: "Inhomogeneous opacification in the right upper lobe could represent early pneumonia versus more chronic changes in this patient with known asthma."    Pt was received awake, alert and cooperative. Sitting upright and breathing comfortably on room air. Pt reports consuming a regular solid diet and thin liquids at home prior to admit.

## 2024-03-05 NOTE — DISCHARGE NOTE NURSING/CASE MANAGEMENT/SOCIAL WORK - PATIENT PORTAL LINK FT
You can access the FollowMyHealth Patient Portal offered by [facility] by registering at the following website: [email or web address]. By joining [facility]’s FollowMyHealth portal, you will also be able to view your health information using other applications (apps) compatible with our system. normal...

## 2024-03-05 NOTE — SWALLOW BEDSIDE ASSESSMENT ADULT - SWALLOW EVAL: DIAGNOSIS
Patient presents with a functional oropharyngeal swallow to safely advance to a regular solid diet and thin liquids. Functional oral prep and transit appreciated across all textures, as well as timely pharyngeal trigger (suspected), palpable hyolaryngeal elevation/excursion and no overt, clinical s/s of laryngeal penetration/aspiration noted. Given RUL opacity, this service to follow as schedule permits to ensure ongoing diet tolerance. Would defer instrumental testing given low suspicion for silent aspiration at this time.

## 2024-03-05 NOTE — CHART NOTE - NSCHARTNOTEFT_GEN_A_CORE
This is 84 y/o M PMhx Afib on Nebivolol and Xarelto, DM2 on metformin and Jardiance, Htn, Asthma presents with c/o SOB was treated for Pneumonia. Patient require a rolling walker at home due to generalized weakness and unsteady gait to help complete their MRADLs.
med stable for DC. Awaiting DME.   Appreciate CM assistance.   DC home w/ OP follow up.  Refer to DC sum for details.
Stable

## 2024-03-06 LAB
CULTURE RESULTS: SIGNIFICANT CHANGE UP
CULTURE RESULTS: SIGNIFICANT CHANGE UP
SPECIMEN SOURCE: SIGNIFICANT CHANGE UP
SPECIMEN SOURCE: SIGNIFICANT CHANGE UP

## 2024-05-15 ENCOUNTER — INPATIENT (INPATIENT)
Facility: HOSPITAL | Age: 84
LOS: 6 days | Discharge: NURSING, REHAB OR LONG-TERM CARE | End: 2024-05-22
Attending: STUDENT IN AN ORGANIZED HEALTH CARE EDUCATION/TRAINING PROGRAM | Admitting: STUDENT IN AN ORGANIZED HEALTH CARE EDUCATION/TRAINING PROGRAM
Payer: MEDICARE

## 2024-05-15 VITALS
RESPIRATION RATE: 18 BRPM | OXYGEN SATURATION: 100 % | DIASTOLIC BLOOD PRESSURE: 42 MMHG | HEART RATE: 39 BPM | SYSTOLIC BLOOD PRESSURE: 65 MMHG

## 2024-05-15 DIAGNOSIS — R00.1 BRADYCARDIA, UNSPECIFIED: ICD-10-CM

## 2024-05-15 DIAGNOSIS — I48.91 UNSPECIFIED ATRIAL FIBRILLATION: ICD-10-CM

## 2024-05-15 DIAGNOSIS — N17.9 ACUTE KIDNEY FAILURE, UNSPECIFIED: ICD-10-CM

## 2024-05-15 DIAGNOSIS — I10 ESSENTIAL (PRIMARY) HYPERTENSION: ICD-10-CM

## 2024-05-15 DIAGNOSIS — E78.5 HYPERLIPIDEMIA, UNSPECIFIED: ICD-10-CM

## 2024-05-15 DIAGNOSIS — E11.9 TYPE 2 DIABETES MELLITUS WITHOUT COMPLICATIONS: ICD-10-CM

## 2024-05-15 DIAGNOSIS — R41.82 ALTERED MENTAL STATUS, UNSPECIFIED: ICD-10-CM

## 2024-05-15 LAB
ALBUMIN SERPL ELPH-MCNC: 3.7 G/DL — SIGNIFICANT CHANGE UP (ref 3.3–5)
ALP SERPL-CCNC: 73 U/L — SIGNIFICANT CHANGE UP (ref 40–120)
ALT FLD-CCNC: 14 U/L — SIGNIFICANT CHANGE UP (ref 4–41)
ANION GAP SERPL CALC-SCNC: 12 MMOL/L — SIGNIFICANT CHANGE UP (ref 7–14)
AST SERPL-CCNC: 17 U/L — SIGNIFICANT CHANGE UP (ref 4–40)
BASE EXCESS BLDV CALC-SCNC: -4.8 MMOL/L — LOW (ref -2–3)
BASOPHILS # BLD AUTO: 0.01 K/UL — SIGNIFICANT CHANGE UP (ref 0–0.2)
BASOPHILS NFR BLD AUTO: 0.1 % — SIGNIFICANT CHANGE UP (ref 0–2)
BILIRUB SERPL-MCNC: 0.2 MG/DL — SIGNIFICANT CHANGE UP (ref 0.2–1.2)
BLD GP AB SCN SERPL QL: NEGATIVE — SIGNIFICANT CHANGE UP
BLOOD GAS VENOUS COMPREHENSIVE RESULT: SIGNIFICANT CHANGE UP
BUN SERPL-MCNC: 36 MG/DL — HIGH (ref 7–23)
CALCIUM SERPL-MCNC: 8.3 MG/DL — LOW (ref 8.4–10.5)
CHLORIDE BLDV-SCNC: 102 MMOL/L — SIGNIFICANT CHANGE UP (ref 96–108)
CHLORIDE SERPL-SCNC: 103 MMOL/L — SIGNIFICANT CHANGE UP (ref 98–107)
CO2 BLDV-SCNC: 25.4 MMOL/L — SIGNIFICANT CHANGE UP (ref 22–26)
CO2 SERPL-SCNC: 20 MMOL/L — LOW (ref 22–31)
CREAT SERPL-MCNC: 1.7 MG/DL — HIGH (ref 0.5–1.3)
EGFR: 39 ML/MIN/1.73M2 — LOW
EOSINOPHIL # BLD AUTO: 0.04 K/UL — SIGNIFICANT CHANGE UP (ref 0–0.5)
EOSINOPHIL NFR BLD AUTO: 0.6 % — SIGNIFICANT CHANGE UP (ref 0–6)
GAS PNL BLDV: 132 MMOL/L — LOW (ref 136–145)
GLUCOSE BLDC GLUCOMTR-MCNC: 180 MG/DL — HIGH (ref 70–99)
GLUCOSE BLDC GLUCOMTR-MCNC: 221 MG/DL — HIGH (ref 70–99)
GLUCOSE BLDC GLUCOMTR-MCNC: 298 MG/DL — HIGH (ref 70–99)
GLUCOSE BLDV-MCNC: 163 MG/DL — HIGH (ref 70–99)
GLUCOSE SERPL-MCNC: 159 MG/DL — HIGH (ref 70–99)
HCO3 BLDV-SCNC: 24 MMOL/L — SIGNIFICANT CHANGE UP (ref 22–29)
HCT VFR BLD CALC: 36.7 % — LOW (ref 39–50)
HCT VFR BLDA CALC: 34 % — LOW (ref 39–51)
HGB BLD CALC-MCNC: 11.4 G/DL — LOW (ref 12.6–17.4)
HGB BLD-MCNC: 11.2 G/DL — LOW (ref 13–17)
IANC: 5.93 K/UL — SIGNIFICANT CHANGE UP (ref 1.8–7.4)
IMM GRANULOCYTES NFR BLD AUTO: 0.4 % — SIGNIFICANT CHANGE UP (ref 0–0.9)
LACTATE BLDV-MCNC: 2.4 MMOL/L — HIGH (ref 0.5–2)
LIDOCAIN IGE QN: 66 U/L — HIGH (ref 7–60)
LYMPHOCYTES # BLD AUTO: 0.8 K/UL — LOW (ref 1–3.3)
LYMPHOCYTES # BLD AUTO: 11.1 % — LOW (ref 13–44)
MAGNESIUM SERPL-MCNC: 2.1 MG/DL — SIGNIFICANT CHANGE UP (ref 1.6–2.6)
MCHC RBC-ENTMCNC: 26 PG — LOW (ref 27–34)
MCHC RBC-ENTMCNC: 30.5 GM/DL — LOW (ref 32–36)
MCV RBC AUTO: 85.3 FL — SIGNIFICANT CHANGE UP (ref 80–100)
MONOCYTES # BLD AUTO: 0.39 K/UL — SIGNIFICANT CHANGE UP (ref 0–0.9)
MONOCYTES NFR BLD AUTO: 5.4 % — SIGNIFICANT CHANGE UP (ref 2–14)
NEUTROPHILS # BLD AUTO: 5.93 K/UL — SIGNIFICANT CHANGE UP (ref 1.8–7.4)
NEUTROPHILS NFR BLD AUTO: 82.4 % — HIGH (ref 43–77)
NRBC # BLD: 0 /100 WBCS — SIGNIFICANT CHANGE UP (ref 0–0)
NRBC # FLD: 0 K/UL — SIGNIFICANT CHANGE UP (ref 0–0)
NT-PROBNP SERPL-SCNC: 3009 PG/ML — HIGH
PCO2 BLDV: 59 MMHG — HIGH (ref 42–55)
PH BLDV: 7.21 — LOW (ref 7.32–7.43)
PHOSPHATE SERPL-MCNC: 4.1 MG/DL — SIGNIFICANT CHANGE UP (ref 2.5–4.5)
PLATELET # BLD AUTO: 119 K/UL — LOW (ref 150–400)
PO2 BLDV: 22 MMHG — LOW (ref 25–45)
POTASSIUM BLDV-SCNC: 6.6 MMOL/L — CRITICAL HIGH (ref 3.5–5.1)
POTASSIUM SERPL-MCNC: 6.6 MMOL/L — CRITICAL HIGH (ref 3.5–5.3)
POTASSIUM SERPL-SCNC: 6.6 MMOL/L — CRITICAL HIGH (ref 3.5–5.3)
PROT SERPL-MCNC: 6.2 G/DL — SIGNIFICANT CHANGE UP (ref 6–8.3)
RBC # BLD: 4.3 M/UL — SIGNIFICANT CHANGE UP (ref 4.2–5.8)
RBC # FLD: 15 % — HIGH (ref 10.3–14.5)
RH IG SCN BLD-IMP: POSITIVE — SIGNIFICANT CHANGE UP
SAO2 % BLDV: 25.2 % — LOW (ref 67–88)
SODIUM SERPL-SCNC: 135 MMOL/L — SIGNIFICANT CHANGE UP (ref 135–145)
TROPONIN T, HIGH SENSITIVITY RESULT: 38 NG/L — SIGNIFICANT CHANGE UP
TSH SERPL-MCNC: 0.14 UIU/ML — LOW (ref 0.27–4.2)
WBC # BLD: 7.2 K/UL — SIGNIFICANT CHANGE UP (ref 3.8–10.5)
WBC # FLD AUTO: 7.2 K/UL — SIGNIFICANT CHANGE UP (ref 3.8–10.5)

## 2024-05-15 PROCEDURE — 70450 CT HEAD/BRAIN W/O DYE: CPT | Mod: 26

## 2024-05-15 PROCEDURE — 99285 EMERGENCY DEPT VISIT HI MDM: CPT

## 2024-05-15 PROCEDURE — 71045 X-RAY EXAM CHEST 1 VIEW: CPT | Mod: 26

## 2024-05-15 RX ORDER — DEXTROSE 50 % IN WATER 50 %
15 SYRINGE (ML) INTRAVENOUS ONCE
Refills: 0 | Status: DISCONTINUED | OUTPATIENT
Start: 2024-05-15 | End: 2024-05-22

## 2024-05-15 RX ORDER — DEXTROSE 50 % IN WATER 50 %
12.5 SYRINGE (ML) INTRAVENOUS ONCE
Refills: 0 | Status: DISCONTINUED | OUTPATIENT
Start: 2024-05-15 | End: 2024-05-22

## 2024-05-15 RX ORDER — INSULIN LISPRO 100/ML
VIAL (ML) SUBCUTANEOUS EVERY 6 HOURS
Refills: 0 | Status: DISCONTINUED | OUTPATIENT
Start: 2024-05-15 | End: 2024-05-16

## 2024-05-15 RX ORDER — ATROPINE SULFATE 0.1 MG/ML
1 SYRINGE (ML) INJECTION ONCE
Refills: 0 | Status: COMPLETED | OUTPATIENT
Start: 2024-05-15 | End: 2024-05-15

## 2024-05-15 RX ORDER — GLUCAGON INJECTION, SOLUTION 0.5 MG/.1ML
5 INJECTION, SOLUTION SUBCUTANEOUS ONCE
Refills: 0 | Status: COMPLETED | OUTPATIENT
Start: 2024-05-15 | End: 2024-05-15

## 2024-05-15 RX ORDER — INSULIN HUMAN 100 [IU]/ML
10 INJECTION, SOLUTION SUBCUTANEOUS ONCE
Refills: 0 | Status: COMPLETED | OUTPATIENT
Start: 2024-05-15 | End: 2024-05-15

## 2024-05-15 RX ORDER — DEXTROSE 50 % IN WATER 50 %
25 SYRINGE (ML) INTRAVENOUS ONCE
Refills: 0 | Status: DISCONTINUED | OUTPATIENT
Start: 2024-05-15 | End: 2024-05-22

## 2024-05-15 RX ORDER — DEXTROSE 50 % IN WATER 50 %
50 SYRINGE (ML) INTRAVENOUS ONCE
Refills: 0 | Status: COMPLETED | OUTPATIENT
Start: 2024-05-15 | End: 2024-05-15

## 2024-05-15 RX ORDER — SODIUM CHLORIDE 9 MG/ML
1000 INJECTION, SOLUTION INTRAVENOUS
Refills: 0 | Status: DISCONTINUED | OUTPATIENT
Start: 2024-05-15 | End: 2024-05-22

## 2024-05-15 RX ORDER — NOREPINEPHRINE BITARTRATE/D5W 8 MG/250ML
0.05 PLASTIC BAG, INJECTION (ML) INTRAVENOUS
Qty: 8 | Refills: 0 | Status: DISCONTINUED | OUTPATIENT
Start: 2024-05-15 | End: 2024-05-16

## 2024-05-15 RX ORDER — IPRATROPIUM/ALBUTEROL SULFATE 18-103MCG
3 AEROSOL WITH ADAPTER (GRAM) INHALATION ONCE
Refills: 0 | Status: COMPLETED | OUTPATIENT
Start: 2024-05-15 | End: 2024-05-15

## 2024-05-15 RX ORDER — GLUCAGON INJECTION, SOLUTION 0.5 MG/.1ML
1 INJECTION, SOLUTION SUBCUTANEOUS ONCE
Refills: 0 | Status: DISCONTINUED | OUTPATIENT
Start: 2024-05-15 | End: 2024-05-22

## 2024-05-15 RX ORDER — CALCIUM GLUCONATE 100 MG/ML
2 VIAL (ML) INTRAVENOUS ONCE
Refills: 0 | Status: COMPLETED | OUTPATIENT
Start: 2024-05-15 | End: 2024-05-15

## 2024-05-15 RX ORDER — ATORVASTATIN CALCIUM 80 MG/1
20 TABLET, FILM COATED ORAL AT BEDTIME
Refills: 0 | Status: DISCONTINUED | OUTPATIENT
Start: 2024-05-15 | End: 2024-05-22

## 2024-05-15 RX ORDER — DEXTROSE 10 % IN WATER 10 %
125 INTRAVENOUS SOLUTION INTRAVENOUS ONCE
Refills: 0 | Status: DISCONTINUED | OUTPATIENT
Start: 2024-05-15 | End: 2024-05-22

## 2024-05-15 RX ORDER — SODIUM CHLORIDE 9 MG/ML
1000 INJECTION INTRAMUSCULAR; INTRAVENOUS; SUBCUTANEOUS ONCE
Refills: 0 | Status: COMPLETED | OUTPATIENT
Start: 2024-05-15 | End: 2024-05-15

## 2024-05-15 RX ORDER — HALOPERIDOL DECANOATE 100 MG/ML
5 INJECTION INTRAMUSCULAR ONCE
Refills: 0 | Status: COMPLETED | OUTPATIENT
Start: 2024-05-15 | End: 2024-05-15

## 2024-05-15 RX ORDER — CHLORHEXIDINE GLUCONATE 213 G/1000ML
1 SOLUTION TOPICAL
Refills: 0 | Status: DISCONTINUED | OUTPATIENT
Start: 2024-05-15 | End: 2024-05-22

## 2024-05-15 RX ADMIN — SODIUM CHLORIDE 1000 MILLILITER(S): 9 INJECTION INTRAMUSCULAR; INTRAVENOUS; SUBCUTANEOUS at 18:40

## 2024-05-15 RX ADMIN — Medication 200 GRAM(S): at 19:49

## 2024-05-15 RX ADMIN — HALOPERIDOL DECANOATE 5 MILLIGRAM(S): 100 INJECTION INTRAMUSCULAR at 20:35

## 2024-05-15 RX ADMIN — Medication 6.56 MICROGRAM(S)/KG/MIN: at 19:34

## 2024-05-15 RX ADMIN — INSULIN HUMAN 10 UNIT(S): 100 INJECTION, SOLUTION SUBCUTANEOUS at 21:13

## 2024-05-15 RX ADMIN — Medication 3 MILLILITER(S): at 20:03

## 2024-05-15 RX ADMIN — Medication 1 MILLIGRAM(S): at 18:41

## 2024-05-15 RX ADMIN — Medication 50 MILLILITER(S): at 21:13

## 2024-05-15 RX ADMIN — GLUCAGON INJECTION, SOLUTION 5 MILLIGRAM(S): 0.5 INJECTION, SOLUTION SUBCUTANEOUS at 20:02

## 2024-05-15 NOTE — ED PROVIDER NOTE - CLINICAL SUMMARY MEDICAL DECISION MAKING FREE TEXT BOX
Adina - 83M with PMH of chronic Afib on Xarelto, asthma, DM2, HTN, recent PNA presenting to the ED with hypotension, AMS, bradycardia. Got 2 doses of atropine by EMS. In ED, pt hypotensive to 50s/30s and still cory to 30s and confused. Gave 1mg atropine in ED and 1 L fluids, started norepinephrine. pacer pads placed. will check sepsis labs though afebrile. Ct head. cxr, ekg. ddx includes but is not limited to sepsis vs lyte abnormality vs ACS vs beta blocker overdose. will give glucagon and calcium for potential beta blocker overdose. duonebs for wheezing, possible acute asthma vs pna

## 2024-05-15 NOTE — H&P ADULT - PROBLEM SELECTOR PLAN 3
possibly from low flow state 2nd to bradycardia  hold nephrotoxic agents  if creatinine continues to rise consider Renal US and Nephrology consult possibly from low flow state 2nd to bradycardia  Obtain CT head to r/o Stroke given Afib history  Obtain Tox Screen

## 2024-05-15 NOTE — ED ADULT NURSE NOTE - NSFALLHARMRISKINTERV_ED_ALL_ED
Assistance OOB with selected safe patient handling equipment if applicable/Assistance with ambulation/Communicate risk of Fall with Harm to all staff, patient, and family/Monitor for mental status changes and reorient to person, place, and time, as needed/Move patient closer to nursing station/within visual sight of ED staff/Provide visual cue: red socks, yellow wristband, yellow gown, etc/Reinforce activity limits and safety measures with patient and family/Toileting schedule using arm’s reach rule for commode and bathroom/Use of alarms - bed, stretcher, chair and/or video monitoring/Bed in lowest position, wheels locked, appropriate side rails in place/Call bell, personal items and telephone in reach/Instruct patient to call for assistance before getting out of bed/chair/stretcher/Non-slip footwear applied when patient is off stretcher/[place] to call system/Physically safe environment - no spills, clutter or unnecessary equipment/Purposeful Proactive Rounding/Room/bathroom lighting operational, light cord in reach

## 2024-05-15 NOTE — ED PROVIDER NOTE - OBJECTIVE STATEMENT
83M with PMH of chronic Afib on Xarelto, asthma, DM2, HTN, recent PNA presenting to the ED with hypotension, AMS, bradycardia. Pt has not been acting like self for past day, feeling lightheaded. Daughter at bedside also notes he started wheezing in ED though no complaints of SOB or CP. Got 2 doses of atropine by EMS. In ED, pt hypotensive to 50s/30s and still cory to 30s and confused.

## 2024-05-15 NOTE — H&P ADULT - ASSESSMENT
83M PMH Atrial Fibrillation on Xarelto, Hypertension, Hyperlipidemia, Type 2 Diabetes Mellitus with Diabetic Neuropathy, recently admitted (3/2024) for right upper lobe PNA presents to ED with symptomatic junctional bradycardia (30s), hypotension requiring levophed and altered mental status. Symptomatic junctional rhythm likely in setting of beta blocker toxicity given response to glucagon.  Patient admitted to CCU.

## 2024-05-15 NOTE — ED ADULT NURSE REASSESSMENT NOTE - NS ED NURSE REASSESS COMMENT FT1
Report received from lolis Orozco. Patient A&Ox0, restless in stretcher, respirations even and unlabored, remains on 3L nasal canula, spO2 100%. Remains on cardiac monitor and Zoll- sinus bradycardiac. Haldol administered per MD orders. Patient awaiting CT scan. Family at bedside.

## 2024-05-15 NOTE — H&P ADULT - HISTORY OF PRESENT ILLNESS
This is an 83 year old man with past medical history of Atrial Fibrillation on Xarelto, Hypertension, Hyperlipidemia, Type 2 Diabetes Mellitus with Diabetic Neuropathy, recently admitted (3/2024) for right upper lobe PNA presents to ED with symptomatic junctional bradycardia (30s), hypotension requiring levophed and altered mental status. In ED patient received atropine with little response, calcium gluconate with increase in HR by 10bpm to 40s and glucagon with increase in hr to 50s and increase in BP.  Patient with altered mental status history received from daughter and wife.  Wife reports patient was feeling unwell last night and went to bed, this am he continued to feel unwell stating that he was very weak.  His daughter came in the afternoon and took his BP reporting it was 50/30.  He was brought to ED and en route he became very confused and agitated, not following commands or answering questions.  Family reports he sees Dr. Garrett Clarke and last saw him after his discharge from hospital in 3/2024.  Family is unaware if he has had an echocardiogram, cardiac catheterization or stress test, stating he is normal independent in scheduling his doctor's appointments.  On assessment, patient is awake orient x 3 speaks but not congruent to questions, JOHNATHAN 50s with /90 on Levophed in ED, lungs are diminished at bases, RR 20, SATing 100% on 3L NC, Temp 98.4F, no pedal edema, no JVD.  Labs show no leukocytosis, mild anemia similar to last hospitalization, elevated creatinine 1.7 (baseline 0.9), troponin 38, pBNP 3009, lactate 2.4.

## 2024-05-15 NOTE — ED PROVIDER NOTE - ATTENDING CONTRIBUTION TO CARE
The patient is a 83y Male who has a past medical and surgery history of Afib/Xarelto  HTN DM Asthma PTED with symptomatic bradycardia directly to room 4  patient arrives post atropine twice, unresponsive episodes in-front of EMS. IV placed by EMS. Patient appears lethargic. Patient normally AO 4 now h/o PNA in 3/24 PTED today altered and bradycardic hr high 30s      Vital Signs Last 24 Hrs  HR: 39 BP: 65/42 RR: 18 SpO2: 100% (15 May 2024 18:23)   PE: as described; my additions and exceptions are noted in the chart    DATA:  EKG: Jx bradycardia@ 38 Diagnosis Line Normal sinus rhythm 84 with sinus arrhythmia  Normal ECG    LAB: Pending at time of evaluation      IMPRESSION/RISK:  Dx= symptomatic bradycardia    Consideration include  Repeat VS  T(F): 98.4 HR: 49 BP: 104/92 BP(mean): 99 RR: 22 SpO2: 95% (15 May 2024 19:50) (90% - 100%)  Cardiology at bedside   Plan  repeat labs troponin K 6 on vbg but no t changes am treating with glucagon and calcium

## 2024-05-15 NOTE — ED ADULT NURSE NOTE - OBJECTIVE STATEMENT
Jesi RN: 83 year old male, received to room 4 as a notification. Pt arrives at a notification for bradycardia, hypoxia, and hypotension. Upon placing pt on monitor, pt noted to have a HR in the 30s. Pt noted to have a systolic in the 60's. IV fluids started. 20G IV placed to L and R wrist. Labs obtained. Medicated as per EMR orders. Pt currently agitated, restless in stretcher, not following commands. As per family at bedside pt A&Ox4, ambulatory and takes care of himself at home. Pt c/o palpitations x1 day. Family took vitals at home and BP was low so EMS was called. Pt does take a beta blocker, pt took medications today, unsure if he took too many today. Past medical history of a fib, asthma, DM, HTN. Pt placed on levophed as per MD orders. Pt placed on Zoll monitor. Pt placed on 2L nasal canula.  Monitoring continues at this time.

## 2024-05-15 NOTE — H&P ADULT - PROBLEM SELECTOR PLAN 5
Hold HTN meds while on levophed CHADSVASC 4  Heparin gtt Full AC if CT Scan Head is negative, continue to hold Xarelto if TVP needed  Hold Nevibolol and Multaq

## 2024-05-15 NOTE — ED ADULT TRIAGE NOTE - CHIEF COMPLAINT QUOTE
patient arrives post atropine twice, unresponsive episodes in-front of EMS. IV placed by EMS. Patient appears lethargic. Straight back to room 4.

## 2024-05-15 NOTE — ED ADULT NURSE REASSESSMENT NOTE - NS ED NURSE REASSESS COMMENT FT1
Float RN: Pt awake, restless and agitated in stretcher. Currently not making any sense as per family at bedside. Pt remains on Zoll monitor, HR in the 50's. Pt on 0.11mcg/kg/min of levophed. MAP >65 at this time. IV's patent. O2 sat % on 3L nasal canula. Family remains at bedside. Pt admitted to CCU, pending inpatient bed assignment. Handoff report given to JAE Sorensen. Safety maintained.

## 2024-05-15 NOTE — ED ADULT NURSE NOTE - ISOLATION TYPE:
Quality 110: Preventive Care And Screening: Influenza Immunization: Influenza Immunization previously received during influenza season
Detail Level: Detailed
Quality 111:Pneumonia Vaccination Status For Older Adults: Pneumococcal Vaccination Previously Received
None

## 2024-05-15 NOTE — H&P ADULT - PROBLEM SELECTOR PLAN 2
possibly from low flow state 2nd to bradycardia  Obtain CT head to r/o Stroke given Afib history  Obtain Tox Screen likely cardiogenic shock given Junctional Bradycardia but will need to r/o Sepsis given elevated lactate and hypothermia  Continue Levophed gtt to maintain MAP >65 may wean to off  Blood Cultures sent  Urinalysis sent  Vancomycin 1gm ivpb now then by trough for creat 1.7  Zosyn 3.375mg ivpb now then q12h x 7 days  If cultures are positive consider ID consult

## 2024-05-15 NOTE — ED PROVIDER NOTE - PHYSICAL EXAMINATION
Liset Holden MD  GENERAL: Patient awake alert NAD.  HEENT: NC/AT, Moist mucous membranes, PERRL, EOMI.  LUNGS: +wheezing audibly, mild tachypnea  CARDIAC: cory to 30s  ABDOMEN: Soft, NT, ND  EXT: No edema. No calf tenderness.   MSK: No deformities.  NEURO: A&Ox0. Moving all extremities.  SKIN: cool and dry. No rash.

## 2024-05-15 NOTE — H&P ADULT - PROBLEM SELECTOR PLAN 4
CHADSVASC 4  Heparin gtt Full AC if CT Scan Head is negative, continue to hold Xarelto if TVP needed  Hold Nevibolol and Multaq possibly from low flow state 2nd to bradycardia  hold nephrotoxic agents  if creatinine continues to rise consider Renal US and Nephrology consult

## 2024-05-15 NOTE — H&P ADULT - PROBLEM SELECTOR PLAN 1
Admit to CCU  Hold av joan blocking agents  Continue Levophed to maintain MAP >65 may wean to off  Obtain T4 given low TSH on Multaq  Obtain records from Dr. Garrett Clarke to include TTE and ST and baseline TSH  TTE in am to evaluate LV function  Discussed with Dr. Almanza no need for TVP given HR 50s post glucagon, glucagon as needed

## 2024-05-16 ENCOUNTER — RESULT REVIEW (OUTPATIENT)
Age: 84
End: 2024-05-16

## 2024-05-16 DIAGNOSIS — I95.9 HYPOTENSION, UNSPECIFIED: ICD-10-CM

## 2024-05-16 PROBLEM — I10 ESSENTIAL (PRIMARY) HYPERTENSION: Chronic | Status: ACTIVE | Noted: 2024-03-01

## 2024-05-16 PROBLEM — J45.909 UNSPECIFIED ASTHMA, UNCOMPLICATED: Chronic | Status: ACTIVE | Noted: 2024-03-01

## 2024-05-16 PROBLEM — E11.9 TYPE 2 DIABETES MELLITUS WITHOUT COMPLICATIONS: Chronic | Status: ACTIVE | Noted: 2024-03-01

## 2024-05-16 PROBLEM — I48.91 UNSPECIFIED ATRIAL FIBRILLATION: Chronic | Status: ACTIVE | Noted: 2024-03-01

## 2024-05-16 LAB
A1C WITH ESTIMATED AVERAGE GLUCOSE RESULT: 7.6 % — HIGH (ref 4–5.6)
ALBUMIN SERPL ELPH-MCNC: 3.4 G/DL — SIGNIFICANT CHANGE UP (ref 3.3–5)
ALP SERPL-CCNC: 81 U/L — SIGNIFICANT CHANGE UP (ref 40–120)
ALT FLD-CCNC: 47 U/L — HIGH (ref 4–41)
ANION GAP SERPL CALC-SCNC: 12 MMOL/L — SIGNIFICANT CHANGE UP (ref 7–14)
ANION GAP SERPL CALC-SCNC: 13 MMOL/L — SIGNIFICANT CHANGE UP (ref 7–14)
ANION GAP SERPL CALC-SCNC: 15 MMOL/L — HIGH (ref 7–14)
APPEARANCE UR: CLEAR — SIGNIFICANT CHANGE UP
APTT BLD: 35.7 SEC — HIGH (ref 24.5–35.6)
APTT BLD: 86 SEC — HIGH (ref 24.5–35.6)
AST SERPL-CCNC: 51 U/L — HIGH (ref 4–40)
BILIRUB SERPL-MCNC: 0.5 MG/DL — SIGNIFICANT CHANGE UP (ref 0.2–1.2)
BILIRUB UR-MCNC: NEGATIVE — SIGNIFICANT CHANGE UP
BUN SERPL-MCNC: 37 MG/DL — HIGH (ref 7–23)
CALCIUM SERPL-MCNC: 9 MG/DL — SIGNIFICANT CHANGE UP (ref 8.4–10.5)
CALCIUM SERPL-MCNC: 9 MG/DL — SIGNIFICANT CHANGE UP (ref 8.4–10.5)
CALCIUM SERPL-MCNC: 9.1 MG/DL — SIGNIFICANT CHANGE UP (ref 8.4–10.5)
CHLORIDE SERPL-SCNC: 104 MMOL/L — SIGNIFICANT CHANGE UP (ref 98–107)
CHLORIDE SERPL-SCNC: 105 MMOL/L — SIGNIFICANT CHANGE UP (ref 98–107)
CHLORIDE SERPL-SCNC: 107 MMOL/L — SIGNIFICANT CHANGE UP (ref 98–107)
CHOLEST SERPL-MCNC: 102 MG/DL — SIGNIFICANT CHANGE UP
CO2 SERPL-SCNC: 16 MMOL/L — LOW (ref 22–31)
CO2 SERPL-SCNC: 17 MMOL/L — LOW (ref 22–31)
CO2 SERPL-SCNC: 21 MMOL/L — LOW (ref 22–31)
COLOR SPEC: YELLOW — SIGNIFICANT CHANGE UP
CREAT SERPL-MCNC: 1.64 MG/DL — HIGH (ref 0.5–1.3)
CREAT SERPL-MCNC: 1.67 MG/DL — HIGH (ref 0.5–1.3)
CREAT SERPL-MCNC: 1.73 MG/DL — HIGH (ref 0.5–1.3)
DIFF PNL FLD: NEGATIVE — SIGNIFICANT CHANGE UP
EGFR: 38 ML/MIN/1.73M2 — LOW
EGFR: 40 ML/MIN/1.73M2 — LOW
EGFR: 41 ML/MIN/1.73M2 — LOW
ESTIMATED AVERAGE GLUCOSE: 171 — SIGNIFICANT CHANGE UP
GLUCOSE BLDC GLUCOMTR-MCNC: 140 MG/DL — HIGH (ref 70–99)
GLUCOSE BLDC GLUCOMTR-MCNC: 141 MG/DL — HIGH (ref 70–99)
GLUCOSE BLDC GLUCOMTR-MCNC: 182 MG/DL — HIGH (ref 70–99)
GLUCOSE BLDC GLUCOMTR-MCNC: 263 MG/DL — HIGH (ref 70–99)
GLUCOSE BLDC GLUCOMTR-MCNC: 99 MG/DL — SIGNIFICANT CHANGE UP (ref 70–99)
GLUCOSE SERPL-MCNC: 134 MG/DL — HIGH (ref 70–99)
GLUCOSE SERPL-MCNC: 140 MG/DL — HIGH (ref 70–99)
GLUCOSE SERPL-MCNC: 165 MG/DL — HIGH (ref 70–99)
GLUCOSE UR QL: >=1000 MG/DL
HCT VFR BLD CALC: 35.3 % — LOW (ref 39–50)
HCT VFR BLD CALC: 38.8 % — LOW (ref 39–50)
HCV AB S/CO SERPL IA: 0.09 S/CO — SIGNIFICANT CHANGE UP (ref 0–0.99)
HCV AB SERPL-IMP: SIGNIFICANT CHANGE UP
HDLC SERPL-MCNC: 43 MG/DL — SIGNIFICANT CHANGE UP
HGB BLD-MCNC: 11.2 G/DL — LOW (ref 13–17)
HGB BLD-MCNC: 12.3 G/DL — LOW (ref 13–17)
INR BLD: 1.24 RATIO — HIGH (ref 0.85–1.18)
KETONES UR-MCNC: NEGATIVE MG/DL — SIGNIFICANT CHANGE UP
LACTATE SERPL-SCNC: 2.6 MMOL/L — HIGH (ref 0.5–2)
LEUKOCYTE ESTERASE UR-ACNC: NEGATIVE — SIGNIFICANT CHANGE UP
LIPID PNL WITH DIRECT LDL SERPL: 46 MG/DL — SIGNIFICANT CHANGE UP
MAGNESIUM SERPL-MCNC: 2.1 MG/DL — SIGNIFICANT CHANGE UP (ref 1.6–2.6)
MAGNESIUM SERPL-MCNC: 2.1 MG/DL — SIGNIFICANT CHANGE UP (ref 1.6–2.6)
MCHC RBC-ENTMCNC: 26.1 PG — LOW (ref 27–34)
MCHC RBC-ENTMCNC: 26.5 PG — LOW (ref 27–34)
MCHC RBC-ENTMCNC: 31.7 GM/DL — LOW (ref 32–36)
MCHC RBC-ENTMCNC: 31.7 GM/DL — LOW (ref 32–36)
MCV RBC AUTO: 82.3 FL — SIGNIFICANT CHANGE UP (ref 80–100)
MCV RBC AUTO: 83.4 FL — SIGNIFICANT CHANGE UP (ref 80–100)
NITRITE UR-MCNC: NEGATIVE — SIGNIFICANT CHANGE UP
NON HDL CHOLESTEROL: 59 MG/DL — SIGNIFICANT CHANGE UP
NRBC # BLD: 0 /100 WBCS — SIGNIFICANT CHANGE UP (ref 0–0)
NRBC # BLD: 0 /100 WBCS — SIGNIFICANT CHANGE UP (ref 0–0)
NRBC # FLD: 0 K/UL — SIGNIFICANT CHANGE UP (ref 0–0)
NRBC # FLD: 0 K/UL — SIGNIFICANT CHANGE UP (ref 0–0)
PH UR: 6 — SIGNIFICANT CHANGE UP (ref 5–8)
PHOSPHATE SERPL-MCNC: 4.4 MG/DL — SIGNIFICANT CHANGE UP (ref 2.5–4.5)
PHOSPHATE SERPL-MCNC: 4.4 MG/DL — SIGNIFICANT CHANGE UP (ref 2.5–4.5)
PLATELET # BLD AUTO: 131 K/UL — LOW (ref 150–400)
PLATELET # BLD AUTO: 132 K/UL — LOW (ref 150–400)
POTASSIUM SERPL-MCNC: 5.2 MMOL/L — SIGNIFICANT CHANGE UP (ref 3.5–5.3)
POTASSIUM SERPL-MCNC: 6 MMOL/L — HIGH (ref 3.5–5.3)
POTASSIUM SERPL-MCNC: 6.1 MMOL/L — HIGH (ref 3.5–5.3)
POTASSIUM SERPL-SCNC: 5.2 MMOL/L — SIGNIFICANT CHANGE UP (ref 3.5–5.3)
POTASSIUM SERPL-SCNC: 6 MMOL/L — HIGH (ref 3.5–5.3)
POTASSIUM SERPL-SCNC: 6.1 MMOL/L — HIGH (ref 3.5–5.3)
PROT SERPL-MCNC: 6 G/DL — SIGNIFICANT CHANGE UP (ref 6–8.3)
PROT UR-MCNC: SIGNIFICANT CHANGE UP MG/DL
PROTHROM AB SERPL-ACNC: 13.8 SEC — HIGH (ref 9.5–13)
RBC # BLD: 4.29 M/UL — SIGNIFICANT CHANGE UP (ref 4.2–5.8)
RBC # BLD: 4.65 M/UL — SIGNIFICANT CHANGE UP (ref 4.2–5.8)
RBC # FLD: 14.8 % — HIGH (ref 10.3–14.5)
RBC # FLD: 15.1 % — HIGH (ref 10.3–14.5)
RH IG SCN BLD-IMP: POSITIVE — SIGNIFICANT CHANGE UP
SODIUM SERPL-SCNC: 135 MMOL/L — SIGNIFICANT CHANGE UP (ref 135–145)
SODIUM SERPL-SCNC: 135 MMOL/L — SIGNIFICANT CHANGE UP (ref 135–145)
SODIUM SERPL-SCNC: 140 MMOL/L — SIGNIFICANT CHANGE UP (ref 135–145)
SP GR SPEC: 1.02 — SIGNIFICANT CHANGE UP (ref 1–1.03)
T4 AB SER-ACNC: 5.7 UG/DL — SIGNIFICANT CHANGE UP (ref 5.1–13)
TRIGL SERPL-MCNC: 65 MG/DL — SIGNIFICANT CHANGE UP
UROBILINOGEN FLD QL: 0.2 MG/DL — SIGNIFICANT CHANGE UP (ref 0.2–1)
VANCOMYCIN FLD-MCNC: <4 UG/ML — SIGNIFICANT CHANGE UP
WBC # BLD: 7.39 K/UL — SIGNIFICANT CHANGE UP (ref 3.8–10.5)
WBC # BLD: 9.19 K/UL — SIGNIFICANT CHANGE UP (ref 3.8–10.5)
WBC # FLD AUTO: 7.39 K/UL — SIGNIFICANT CHANGE UP (ref 3.8–10.5)
WBC # FLD AUTO: 9.19 K/UL — SIGNIFICANT CHANGE UP (ref 3.8–10.5)

## 2024-05-16 PROCEDURE — 99222 1ST HOSP IP/OBS MODERATE 55: CPT

## 2024-05-16 PROCEDURE — 93306 TTE W/DOPPLER COMPLETE: CPT | Mod: 26

## 2024-05-16 RX ORDER — RIVAROXABAN 15 MG-20MG
20 KIT ORAL
Refills: 0 | Status: DISCONTINUED | OUTPATIENT
Start: 2024-05-16 | End: 2024-05-16

## 2024-05-16 RX ORDER — VANCOMYCIN HCL 1 G
1000 VIAL (EA) INTRAVENOUS ONCE
Refills: 0 | Status: COMPLETED | OUTPATIENT
Start: 2024-05-16 | End: 2024-05-16

## 2024-05-16 RX ORDER — INSULIN LISPRO 100/ML
VIAL (ML) SUBCUTANEOUS
Refills: 0 | Status: DISCONTINUED | OUTPATIENT
Start: 2024-05-16 | End: 2024-05-22

## 2024-05-16 RX ORDER — INSULIN LISPRO 100/ML
VIAL (ML) SUBCUTANEOUS AT BEDTIME
Refills: 0 | Status: DISCONTINUED | OUTPATIENT
Start: 2024-05-16 | End: 2024-05-22

## 2024-05-16 RX ORDER — HEPARIN SODIUM 5000 [USP'U]/ML
5500 INJECTION INTRAVENOUS; SUBCUTANEOUS EVERY 6 HOURS
Refills: 0 | Status: DISCONTINUED | OUTPATIENT
Start: 2024-05-16 | End: 2024-05-18

## 2024-05-16 RX ORDER — SODIUM ZIRCONIUM CYCLOSILICATE 10 G/10G
5 POWDER, FOR SUSPENSION ORAL ONCE
Refills: 0 | Status: DISCONTINUED | OUTPATIENT
Start: 2024-05-16 | End: 2024-05-17

## 2024-05-16 RX ORDER — GLUCAGON INJECTION, SOLUTION 0.5 MG/.1ML
5 INJECTION, SOLUTION SUBCUTANEOUS ONCE
Refills: 0 | Status: COMPLETED | OUTPATIENT
Start: 2024-05-16 | End: 2024-05-16

## 2024-05-16 RX ORDER — DEXTROSE 50 % IN WATER 50 %
50 SYRINGE (ML) INTRAVENOUS ONCE
Refills: 0 | Status: COMPLETED | OUTPATIENT
Start: 2024-05-16 | End: 2024-05-16

## 2024-05-16 RX ORDER — PIPERACILLIN AND TAZOBACTAM 4; .5 G/20ML; G/20ML
3.38 INJECTION, POWDER, LYOPHILIZED, FOR SOLUTION INTRAVENOUS ONCE
Refills: 0 | Status: COMPLETED | OUTPATIENT
Start: 2024-05-16 | End: 2024-05-16

## 2024-05-16 RX ORDER — FUROSEMIDE 40 MG
20 TABLET ORAL ONCE
Refills: 0 | Status: COMPLETED | OUTPATIENT
Start: 2024-05-16 | End: 2024-05-16

## 2024-05-16 RX ORDER — HEPARIN SODIUM 5000 [USP'U]/ML
1200 INJECTION INTRAVENOUS; SUBCUTANEOUS
Qty: 25000 | Refills: 0 | Status: DISCONTINUED | OUTPATIENT
Start: 2024-05-16 | End: 2024-05-18

## 2024-05-16 RX ORDER — PIPERACILLIN AND TAZOBACTAM 4; .5 G/20ML; G/20ML
3.38 INJECTION, POWDER, LYOPHILIZED, FOR SOLUTION INTRAVENOUS EVERY 12 HOURS
Refills: 0 | Status: DISCONTINUED | OUTPATIENT
Start: 2024-05-16 | End: 2024-05-16

## 2024-05-16 RX ORDER — INSULIN HUMAN 100 [IU]/ML
10 INJECTION, SOLUTION SUBCUTANEOUS ONCE
Refills: 0 | Status: COMPLETED | OUTPATIENT
Start: 2024-05-16 | End: 2024-05-16

## 2024-05-16 RX ORDER — HEPARIN SODIUM 5000 [USP'U]/ML
2500 INJECTION INTRAVENOUS; SUBCUTANEOUS EVERY 6 HOURS
Refills: 0 | Status: DISCONTINUED | OUTPATIENT
Start: 2024-05-16 | End: 2024-05-18

## 2024-05-16 RX ADMIN — Medication 250 MILLIGRAM(S): at 01:14

## 2024-05-16 RX ADMIN — HEPARIN SODIUM 1200 UNIT(S)/HR: 5000 INJECTION INTRAVENOUS; SUBCUTANEOUS at 19:49

## 2024-05-16 RX ADMIN — GLUCAGON INJECTION, SOLUTION 5 MILLIGRAM(S): 0.5 INJECTION, SOLUTION SUBCUTANEOUS at 01:38

## 2024-05-16 RX ADMIN — Medication 50 MILLILITER(S): at 02:48

## 2024-05-16 RX ADMIN — Medication 6.56 MICROGRAM(S)/KG/MIN: at 07:36

## 2024-05-16 RX ADMIN — CHLORHEXIDINE GLUCONATE 1 APPLICATION(S): 213 SOLUTION TOPICAL at 04:59

## 2024-05-16 RX ADMIN — INSULIN HUMAN 10 UNIT(S): 100 INJECTION, SOLUTION SUBCUTANEOUS at 02:49

## 2024-05-16 RX ADMIN — HEPARIN SODIUM 1200 UNIT(S)/HR: 5000 INJECTION INTRAVENOUS; SUBCUTANEOUS at 12:57

## 2024-05-16 RX ADMIN — PIPERACILLIN AND TAZOBACTAM 200 GRAM(S): 4; .5 INJECTION, POWDER, LYOPHILIZED, FOR SOLUTION INTRAVENOUS at 01:14

## 2024-05-16 RX ADMIN — PIPERACILLIN AND TAZOBACTAM 25 GRAM(S): 4; .5 INJECTION, POWDER, LYOPHILIZED, FOR SOLUTION INTRAVENOUS at 03:46

## 2024-05-16 RX ADMIN — ATORVASTATIN CALCIUM 20 MILLIGRAM(S): 80 TABLET, FILM COATED ORAL at 21:02

## 2024-05-16 RX ADMIN — HEPARIN SODIUM 1200 UNIT(S)/HR: 5000 INJECTION INTRAVENOUS; SUBCUTANEOUS at 21:12

## 2024-05-16 RX ADMIN — Medication 20 MILLIGRAM(S): at 03:46

## 2024-05-16 NOTE — DIETITIAN INITIAL EVALUATION ADULT - ETIOLOGY
Pt meets criteria for severe malnutrition in the context of acute in chronic illness  endocrine dysfunction

## 2024-05-16 NOTE — PROGRESS NOTE ADULT - ASSESSMENT
83M PMH Atrial Fibrillation on Xarelto, Hypertension, Hyperlipidemia, Type 2 Diabetes Mellitus with Diabetic Neuropathy, recently admitted (3/2024) for right upper lobe PNA presents to ED with symptomatic junctional bradycardia (30s), hypotension requiring levophed and altered mental status. Symptomatic junctional rhythm likely in setting of beta blocker toxicity given w/ response to glucagon.  Patient admitted to CCU.    =====NEURO=====  - AAOX3, but confused  - CTH negative for acute infarct/hemorrhage    #Symptomatic bradycardia  - Holding AV joan blocking agents and beta blockers (at home on dromidarone 400 and nebivolol 2.5)  - C/w levophed to maintain MAP >65, wean as tolerated  - TSH low, free T4 normal  - No prior TTE, EKG in chart   - glucagon PRN   [] f/u TTE    83M PMH Atrial Fibrillation on Xarelto, Hypertension, Hyperlipidemia, Type 2 Diabetes Mellitus with Diabetic Neuropathy, recently admitted (3/2024) for right upper lobe PNA presents to ED with symptomatic junctional bradycardia (30s), hypotension requiring levophed and altered mental status. Symptomatic junctional rhythm likely in setting of beta blocker toxicity given w/ response to glucagon.  Patient admitted to CCU.    =====NEURO=====  - AAOX3, but confused  - CTH negative for acute infarct/hemorrhage    =====RESPIRATORY=====  #?Bronchitis  [] f/u RVP panel    =====CARDIOVASCULAR=====  #Symptomatic bradycardia  - Holding AV joan blocking agents and beta blockers (at home on dromidarone 400 and nebivolol 2.5)  - C/w levophed to maintain MAP >65, wean as tolerated  - TSH low, free T4 normal  - No prior TTE, EKG in chart   - glucagon PRN   [] f/u TTE     #Hypotension  - Likely 2/2 bradycardia   - No longer requiring pressor support    #Afib  - CHADSVASc: 4   - C/w xarelto for AC      =====/RENAL=====  #ANAMARIA  - Cr 1.7 (baseline <1)  - Likely ATN 2/2 hypotension   - Monitor UOP, trend Cr  [] f/u urine lytes     =====ENDO=====  #T2DM  - A1c 7.6  - On low dose insulin sliding scale TID and QHS    =====INFECTIOUS DISEASE=====  #Elevated lactate   - S/p 1 dose vanc/zosyn given transient hypothermia  - Likely i/s/o hypotension   - Continue to trend   - Low suspicion for infection (normal WBC, asymptomatic, afebrile)   [] f/u cultures; hold off antibiotics for now     =====HEME=====  DVT ppx: xarelto for AC     83M PMH Atrial Fibrillation on Xarelto, Hypertension, Hyperlipidemia, Type 2 Diabetes Mellitus with Diabetic Neuropathy, recently admitted (3/2024) for right upper lobe PNA presents to ED with symptomatic junctional bradycardia (30s), hypotension requiring levophed and altered mental status. Symptomatic junctional rhythm likely in setting of beta blocker toxicity given w/ response to glucagon.  Patient admitted to CCU.    =====NEURO=====  - AAOX3, but confused  - CTH negative for acute infarct/hemorrhage    =====RESPIRATORY=====  #?Bronchitis  [] f/u RVP panel    =====CARDIOVASCULAR=====  #Symptomatic bradycardia  - Holding AV joan blocking agents and beta blockers (at home on dromidarone 400 and nebivolol 2.5)  - C/w levophed to maintain MAP >65, wean as tolerated  - TSH low, free T4 normal  - No prior TTE, EKG in chart   - glucagon PRN   [] f/u TTE     #Hypotension  - Likely 2/2 bradycardia   - No longer requiring pressor support    #Afib  - CHADSVASc: 4   - On heparin gtt for AC; xarelto contraindicated i/s/o ANAMARIA      =====/RENAL=====  #ANAMARIA  - Cr 1.7 (baseline <1)  - Likely ATN 2/2 hypotension   - Monitor UOP, trend Cr  [] f/u urine lytes     =====ENDO=====  #T2DM  - A1c 7.6  - On low dose insulin sliding scale TID and QHS    =====INFECTIOUS DISEASE=====  #Elevated lactate   - S/p 1 dose vanc/zosyn given transient hypothermia  - Likely i/s/o hypotension   - Continue to trend   - Low suspicion for infection (normal WBC, asymptomatic, afebrile)   [] f/u cultures; hold off antibiotics for now     =====HEME=====  DVT ppx: heparin gttfor AC

## 2024-05-16 NOTE — PROGRESS NOTE ADULT - PROBLEM SELECTOR PLAN 3
possibly from low flow state 2nd to bradycardia  Obtain CT head to r/o Stroke given Afib history  Obtain Tox Screen

## 2024-05-16 NOTE — CHART NOTE - NSCHARTNOTEFT_GEN_A_CORE
Accepting hospitalist:  Bed: 7N 708 A  Signout given to MAR    Brief hospital course:  83M PMH Atrial Fibrillation on Xarelto, Hypertension, Hyperlipidemia, Type 2 Diabetes Mellitus with Diabetic Neuropathy, recently admitted (3/2024) for right upper lobe PNA who presented to the ED with symptomatic junctional bradycardia (30s), hypotension briefly requiring levophed and altered mental status. Found to be hyperkalemic and w/ ANAMARIA. Received atropine x 1, glucagon x 2, and shifted with insulin/dextrose.   Per chart review, patient on dronedarone and nebivolol which are both being held. Given rapid clinical improvement and symptomatic response to glucagon, likely i/s/o medication interaction.   In the CCU on telemetry patient with NSR and occasional episodes of sinus bradycardia, asymptomatic. Levophed gtt stopped this morning. Accepting hospitalist: Dr. Cornejo  Bed: 7N 708 A      Brief hospital course:  83M PMH Atrial Fibrillation on Xarelto, Hypertension, Hyperlipidemia, Type 2 Diabetes Mellitus with Diabetic Neuropathy, recently admitted (3/2024) for right upper lobe PNA who presented to the ED with symptomatic junctional bradycardia (30s), hypotension briefly requiring levophed and altered mental status. Found to be hyperkalemic and w/ ANAMARIA. Received atropine x 1, glucagon x 2, and shifted with insulin/dextrose.   Per chart review, patient on dronedarone and nebivolol which are both being held. Given rapid clinical improvement and symptomatic response to glucagon, likely i/s/o medication interaction.   In the CCU on telemetry patient with NSR and occasional episodes of sinus bradycardia, asymptomatic. Levophed gtt stopped this morning. Resumed home xarelto, continuing to hold anti-hypertensives and AV joan blockers.    To do:  [] f/u TTE  [] Monitor Cr  [] Monitor on tele, consult EP if recurrent episodes of bradycardia  [] outpatient records  - PCP on file is incorrect, per patient it is Dr. Garrett Clarke.  [] med rec Accepting hospitalist: Dr. Cornejo  Bed: 7N 708 A      Brief hospital course:  83M PMH Atrial Fibrillation on Xarelto, Hypertension, Hyperlipidemia, Type 2 Diabetes Mellitus with Diabetic Neuropathy, recently admitted (3/2024) for right upper lobe PNA who presented to the ED with symptomatic junctional bradycardia (30s), hypotension briefly requiring levophed and altered mental status. Found to be hyperkalemic and w/ ANAMARIA. Received atropine x 1, glucagon x 2, and shifted with insulin/dextrose.   Per chart review, patient on dronedarone and nebivolol which are both being held. Given rapid clinical improvement and symptomatic response to glucagon, likely i/s/o medication interaction.   In the CCU on telemetry patient with NSR and occasional episodes of sinus bradycardia, asymptomatic. Levophed gtt stopped this morning. Home xarelto held i/s/o ANAMARIA, on heparin gtt. Continuing to hold anti-hypertensives and AV joan blockers.    To do:  [] f/u TTE  [] Monitor Cr  [] Monitor on tele, consult EP if recurrent episodes of bradycardia  [] outpatient records  - PCP on file is incorrect, per patient it is Dr. Garrett Clarke.  [] med rec Accepting hospitalist: Dr. Cornejo  Bed: 4S 441D      Brief hospital course:  83M PMH Atrial Fibrillation on Xarelto, Hypertension, Hyperlipidemia, Type 2 Diabetes Mellitus with Diabetic Neuropathy, recently admitted (3/2024) for right upper lobe PNA who presented to the ED with symptomatic junctional bradycardia (30s), hypotension briefly requiring levophed and altered mental status. Found to be hyperkalemic and w/ ANAMARIA. Received atropine x 1, glucagon x 2, and shifted with insulin/dextrose.   Per chart review, patient on dronedarone and nebivolol which are both being held. Given rapid clinical improvement and symptomatic response to glucagon, likely i/s/o medication interaction.   In the CCU on telemetry patient with NSR and occasional episodes of sinus bradycardia, asymptomatic. Levophed gtt stopped this morning. Home xarelto held i/s/o ANAMARIA, on heparin gtt. Continuing to hold anti-hypertensives and AV joan blockers.    To do:  [] f/u TTE  [] Monitor Cr  [] Monitor on tele, EP recs  [] outpatient records  - PCP on file is incorrect, per patient it is Dr. Garrett Clarke.  [] med rec Accepting hospitalist: Dr. Cornejo  Bed: 4S 447D      Brief hospital course:  83M PMH Atrial Fibrillation on Xarelto, Hypertension, Hyperlipidemia, Type 2 Diabetes Mellitus with Diabetic Neuropathy, recently admitted (3/2024) for right upper lobe PNA who presented to the ED with symptomatic junctional bradycardia (30s), hypotension briefly requiring levophed and altered mental status. Found to be hyperkalemic and w/ ANAMARIA. Received atropine x 1, glucagon x 2, and shifted with insulin/dextrose.   Per chart review, patient on dronedarone and nebivolol which are both being held. Given rapid clinical improvement and symptomatic response to glucagon, likely i/s/o medication interaction.   In the CCU on telemetry patient with NSR and occasional episodes of sinus bradycardia, asymptomatic. Levophed gtt stopped this morning. Home xarelto held i/s/o ANAMARIA, on heparin gtt. Continuing to hold anti-hypertensives and AV joan blockers.    To do:  [] f/u TTE  [] Monitor Cr  [] Monitor on tele, EP recs  [] outpatient records  - PCP on file is incorrect, per patient it is Dr. Garrett Clarke.  [] med rec

## 2024-05-16 NOTE — DIETITIAN INITIAL EVALUATION ADULT - OTHER INFO
Pt 82 yo male with PMHx of Atrial Fibrillation, Hypertension, Hyperlipidemia, Type 2 Diabetes Mellitus with Diabetic Neuropathy, recently admitted (3/2024) for right upper lobe PNA who presented to the ED with symptomatic junctional bradycardia (30s), hypotension briefly requiring levophed and altered mental status. Found to be hyperkalemic and w/ANAMARIA - per chart review.     At time of visit, Pt awake, alert; Pt speaks Serbian; this RD speaks Serbian as well. Pt's appetite/PO intake fair, reported. No report of chewing or swallowing difficulty. No report of vomiting or diarrhea @ this time. +Last BM (5/16) per flow sheet.     Per Pt, his height: 68", Pt's actual body weight: 158#; Pt with weight loss of ~5# in past 1.5 month (weight change: 3% 1.5 months). Food preferences discussed. Rec to add PO supplement: Glucerna shake - 2x daily to diet rx. Of note, Pt's HbA1c level 7.6% (5/16). Better food options discussed with Pt. RD answered Pt's concerns related to diet. RD remains available, Pt made aware.

## 2024-05-16 NOTE — DIETITIAN INITIAL EVALUATION ADULT - ADD RECOMMEND
1. Encourage & assist Pt with meals; Monitor PO diet tolerance;   2. Honor food preferences;   3. Monitor labs, hydration status;

## 2024-05-16 NOTE — CONSULT NOTE ADULT - NS ATTEND AMEND GEN_ALL_CORE FT
83yoM w/ PMHx Afib on Xarelto, HTN, HLD, DMII initially presented to the ED with AMS found to be hypotensive and bradycardic and was started on Levophed.  EKG consistent with junctional bradycardia and lab work significant for elevated creatinine 1.7 (baseline 0.9) and hyperkalemic 6.0.  Was recently seen 2 months ago for PNA and was discharged to home.  CTH- no acute findings.  Was subsequently admitted to CCU for further monitoring.  On telemetry overnight was in NSR/junctional rhythm.  Remains on levophed.  Hold AV joan blockers. TTE. Continue Xarelto for PAF. May need a pace maker if continues to remain hypotensive and bradycardic,

## 2024-05-16 NOTE — CONSULT NOTE ADULT - ASSESSMENT
83yoM w/ PMHx Afib on Xarelto, HTN, HLD, DMII initially presented to the ED with AMS found to be hypotensive and bradycardic and was started on Levophed.  EKG consistent with junctional bradycardia and lab work significant for elevated creatinine 1.7 (baseline 0.9) and hyperkalemic 6.0.  Was recently seen 2 months ago for PNA and was discharged to home.  CTH- no acute findings.  Was subsequently admitted to CCU for further monitoring.  On telemetry overnight was in NSR/junctional rhythm.  Remains on levophed.      Junctional bradycardia   Continue to monitor on telemetry  Telemetry: NSR 60's with intermittent junctional bradycardia VR 30-40's   Hold AV joan blockers   TTE  Continue Xarelto for PAF  Keep K>4 Mg>2  Monitor patient off home AV joan blockers   May need a pace maker if continues to remain hypotensive and bradycardic

## 2024-05-16 NOTE — CONSULT NOTE ADULT - SUBJECTIVE AND OBJECTIVE BOX
Date of Admission: 5/15/2024    CHIEF COMPLAINT: bradycardia     HISTORY OF PRESENT ILLNESS:  This is a 83yoM w/ PMHx Afib on Xarelto, HTN, HLD, DMII initially presented to the ED with AMS found to be hypotensive and bradycardic and was started on Levophed.  EKG consistent with junctional bradycardia and lab work significant for elevated creatinine 1.7 (baseline 0.9) and hyperkalemic 6.0.  Was recently seen 2 months ago for PNA and was discharged to home.  CTH- no acute findings.  Was subsequently admitted to CCU for further monitoring.  On telemetry overnight was in NSR/junctional rhythm.  Remains on levophed.      Allergies  No Known Allergies  Intolerances    MEDICATIONS:  norepinephrine Infusion 0.05 MICROgram(s)/kG/Min IV Continuous <Continuous>  rivaroxaban 20 milliGRAM(s) Oral with dinner  atorvastatin 20 milliGRAM(s) Oral at bedtime  dextrose 50% Injectable 25 Gram(s) IV Push once  dextrose 50% Injectable 12.5 Gram(s) IV Push once  dextrose Oral Gel 15 Gram(s) Oral once PRN  glucagon  Injectable 1 milliGRAM(s) IntraMuscular once  insulin lispro (ADMELOG) corrective regimen sliding scale   SubCutaneous every 6 hours  chlorhexidine 2% Cloths 1 Application(s) Topical <User Schedule>  dextrose 10% Bolus 125 milliLiter(s) IV Bolus once  dextrose 5%. 1000 milliLiter(s) IV Continuous <Continuous>  dextrose 5%. 1000 milliLiter(s) IV Continuous <Continuous>    PAST MEDICAL & SURGICAL HISTORY:  Afib  HTN (hypertension)  DM (diabetes mellitus)  Asthma  No significant past surgical history    FAMILY HISTORY:    REVIEW OF SYSTEMS:  See HPI. Otherwise, 10 point ROS done and otherwise negative.    PHYSICAL EXAM:  T(C): 36.9 (05-16-24 @ 08:00), Max: 37.3 (05-16-24 @ 00:00)  HR: 67 (05-16-24 @ 10:00) (37 - 71)  BP: 95/55 (05-16-24 @ 10:00) (56/45 - 182/71)  RR: 19 (05-16-24 @ 10:00) (13 - 25)  SpO2: 96% (05-16-24 @ 10:00) (90% - 100%)  Wt(kg): --  I&O's Summary    15 May 2024 07:01  -  16 May 2024 07:00  --------------------------------------------------------  IN: 60.6 mL / OUT: 1265 mL / NET: -1204.4 mL    16 May 2024 07:01  -  16 May 2024 10:58  --------------------------------------------------------  IN: 2 mL / OUT: 400 mL / NET: -398 mL    Appearance: Normal	  HEENT:   Normal oral mucosa, PERRL, EOMI	  Lymphatic: No lymphadenopathy  Cardiovascular: Normal S1 S2, No JVD, No murmurs, No edema  Respiratory: Lungs clear to auscultation	  Psychiatry: A & O x 3, Mood & affect appropriate  Gastrointestinal:  Soft, Non-tender, + BS	  Skin: No rashes, No ecchymoses, No cyanosis	  Neurologic: Non-focal  Extremities: Normal range of motion, No clubbing, cyanosis or edema  Vascular: Peripheral pulses palpable 2+ bilaterally    LABS:	 	  CBC Full  -  ( 16 May 2024 00:20 )  WBC Count : 9.19 K/uL  Hemoglobin : 12.3 g/dL  Hematocrit : 38.8 %  Platelet Count - Automated : 132 K/uL  Mean Cell Volume : 83.4 fL  Mean Cell Hemoglobin : 26.5 pg  Mean Cell Hemoglobin Concentration : 31.7 gm/dL  Auto Neutrophil # : x  Auto Lymphocyte # : x  Auto Monocyte # : x  Auto Eosinophil # : x  Auto Basophil # : x  Auto Neutrophil % : x  Auto Lymphocyte % : x  Auto Monocyte % : x  Auto Eosinophil % : x  Auto Basophil % : x    05-16    140  |  107  |  37<H>  ----------------------------<  134<H>  5.2   |  21<L>  |  1.73<H>  05-16    135  |  104  |  37<H>  ----------------------------<  140<H>  6.1<H>   |  16<L>  |  1.64<H>    Ca    9.0      16 May 2024 06:00  Ca    9.1      16 May 2024 00:20  Phos  4.4     05-16  Phos  4.4     05-16  Mg     2.10     05-16  Mg     2.10     05-16    TPro  6.0  /  Alb  3.4  /  TBili  0.5  /  DBili  x   /  AST  51<H>  /  ALT  47<H>  /  AlkPhos  81  05-15  TPro  6.2  /  Alb  3.7  /  TBili  0.2  /  DBili  x   /  AST  17  /  ALT  14  /  AlkPhos  73  05-15    TSH: Thyroid Stimulating Hormone, Serum: 0.14 uIU/mL (05-15 @ 19:11)

## 2024-05-16 NOTE — PROGRESS NOTE ADULT - PROBLEM SELECTOR PLAN 2
likely cardiogenic shock given Junctional Bradycardia but will need to r/o Sepsis given elevated lactate and hypothermia  Continue Levophed gtt to maintain MAP >65 may wean to off  Blood Cultures sent  Urinalysis sent  Vancomycin 1gm ivpb now then by trough for creat 1.7  Zosyn 3.375mg ivpb now then q12h x 7 days  If cultures are positive consider ID consult

## 2024-05-16 NOTE — CHART NOTE - NSCHARTNOTEFT_GEN_A_CORE
MICU Transfer Note  ---------------------------    Transfer from: MICU  Transfer to:  ( x ) Medicine    (  ) Telemetry    (  ) RCU    (  ) Palliative    (  ) Stroke Unit    (  ) _______________  Accepting Physician: Dr. Cornejo      Brief hospital course:  83M PMH Atrial Fibrillation on Xarelto, Hypertension, Hyperlipidemia, Type 2 Diabetes Mellitus with Diabetic Neuropathy, recently admitted (3/2024) for right upper lobe PNA who presented to the ED with symptomatic junctional bradycardia (30s), hypotension briefly requiring levophed and altered mental status. Found to be hyperkalemic and w/ ANAMARIA. Received atropine x 1, glucagon x 2, and shifted with insulin/dextrose.   Per chart review, patient on dronedarone and nebivolol which are both being held. Given rapid clinical improvement and symptomatic response to glucagon, likely i/s/o medication interaction.   In the CCU on telemetry patient with NSR and occasional episodes of sinus bradycardia, asymptomatic. Levophed gtt stopped this morning. Home xarelto held i/s/o ANAMARIA, on heparin gtt. Continuing to hold anti-hypertensives and AV joan blockers.          OBJECTIVE --  Vital Signs Last 24 Hrs  T(C): 36.9 (16 May 2024 08:00), Max: 37.3 (16 May 2024 00:00)  T(F): 98.5 (16 May 2024 08:00), Max: 99.2 (16 May 2024 00:00)  HR: 68 (16 May 2024 11:00) (37 - 71)  BP: 104/42 (16 May 2024 11:00) (56/45 - 182/71)  BP(mean): 59 (16 May 2024 11:00) (49 - 100)  RR: 20 (16 May 2024 11:00) (13 - 25)  SpO2: 97% (16 May 2024 11:00) (90% - 100%)    Parameters below as of 16 May 2024 08:00  Patient On (Oxygen Delivery Method): room air        I&O's Summary    15 May 2024 07:01  -  16 May 2024 07:00  --------------------------------------------------------  IN: 60.6 mL / OUT: 1265 mL / NET: -1204.4 mL    16 May 2024 07:01  -  16 May 2024 13:11  --------------------------------------------------------  IN: 2 mL / OUT: 1700 mL / NET: -1698 mL        MEDICATIONS  (STANDING):  atorvastatin 20 milliGRAM(s) Oral at bedtime  chlorhexidine 2% Cloths 1 Application(s) Topical <User Schedule>  dextrose 10% Bolus 125 milliLiter(s) IV Bolus once  dextrose 5%. 1000 milliLiter(s) (100 mL/Hr) IV Continuous <Continuous>  dextrose 5%. 1000 milliLiter(s) (50 mL/Hr) IV Continuous <Continuous>  dextrose 50% Injectable 25 Gram(s) IV Push once  dextrose 50% Injectable 12.5 Gram(s) IV Push once  glucagon  Injectable 1 milliGRAM(s) IntraMuscular once  heparin  Infusion. 1200 Unit(s)/Hr (12 mL/Hr) IV Continuous <Continuous>  insulin lispro (ADMELOG) corrective regimen sliding scale   SubCutaneous every 6 hours  sodium zirconium cyclosilicate 5 Gram(s) Oral once    MEDICATIONS  (PRN):  dextrose Oral Gel 15 Gram(s) Oral once PRN Blood Glucose LESS THAN 70 milliGRAM(s)/deciliter  heparin   Injectable 2500 Unit(s) IV Push every 6 hours PRN For aPTT between 40 - 57  heparin   Injectable 5500 Unit(s) IV Push every 6 hours PRN For aPTT less than 40        LABS                                            12.3                  Neurophils% (auto):   x      (05-16 @ 00:20):    9.19 )-----------(132          Lymphocytes% (auto):  x                                             38.8                   Eosinphils% (auto):   x        Manual%: Neutrophils x    ; Lymphocytes x    ; Eosinophils x    ; Bands%: x    ; Blasts x                                    140    |  107    |  37                  Calcium: 9.0   / iCa: x      (05-16 @ 06:00)    ----------------------------<  134       Magnesium: 2.10                             5.2     |  21     |  1.73             Phosphorous: 4.4      TPro  6.0    /  Alb  3.4    /  TBili  0.5    /  DBili  x      /  AST  51     /  ALT  47     /  AlkPhos  81     15 May 2024 23:32    ( 05-16 @ 00:20 )   PT: 13.8 sec;   INR: 1.24 ratio  aPTT: 35.7 sec          ASSESSMENT & PLAN:   83M PMH Atrial Fibrillation on Xarelto, Hypertension, Hyperlipidemia, Type 2 Diabetes Mellitus with Diabetic Neuropathy, recently admitted (3/2024) for right upper lobe PNA presents to ED with symptomatic junctional bradycardia (30s), hypotension requiring levophed and altered mental status. Symptomatic junctional rhythm likely in setting of beta blocker toxicity given w/ response to glucagon.  Patient admitted to CCU.      For Follow-Up:  [] f/u TTE  [] Monitor Cr  [] Monitor on tele, EP recs  [] outpatient records  - PCP on file is incorrect, per patient it is Dr. Garrett Clarke.  [] med rec.

## 2024-05-16 NOTE — DIETITIAN INITIAL EVALUATION ADULT - SIGNS/SYMPTOMS
Pt with physical findings described above; weight change: ~3% x 1.5 months; <75% of kcal intake  elevated HbA1c

## 2024-05-16 NOTE — DIETITIAN NUTRITION RISK NOTIFICATION - TREATMENT: THE FOLLOWING DIET HAS BEEN RECOMMENDED
Diet, Consistent Carbohydrate/No Snacks:   Supplement Feeding Modality:  Oral  Glucerna Shake Cans or Servings Per Day:  2       Frequency:  Daily (05-16-24 @ 11:23) [Active]       via log rolling technique/moderate assist (50% patients effort)

## 2024-05-16 NOTE — PROGRESS NOTE ADULT - SUBJECTIVE AND OBJECTIVE BOX
Subjective/Objective:     Overnight event:  Tele event:    MEDICATIONS    norepinephrine Infusion 0.05 MICROgram(s)/kG/Min IV Continuous <Continuous>    piperacillin/tazobactam IVPB.. 3.375 Gram(s) IV Intermittent every 12 hours          atorvastatin 20 milliGRAM(s) Oral at bedtime  dextrose 50% Injectable 12.5 Gram(s) IV Push once  dextrose 50% Injectable 25 Gram(s) IV Push once  dextrose Oral Gel 15 Gram(s) Oral once PRN  glucagon  Injectable 1 milliGRAM(s) IntraMuscular once  insulin lispro (ADMELOG) corrective regimen sliding scale   SubCutaneous every 6 hours    chlorhexidine 2% Cloths 1 Application(s) Topical <User Schedule>  dextrose 10% Bolus 125 milliLiter(s) IV Bolus once  dextrose 5%. 1000 milliLiter(s) IV Continuous <Continuous>  dextrose 5%. 1000 milliLiter(s) IV Continuous <Continuous>            REVIEW OF SYSTEMS:  Complete 10point ROS negative.    ICU Vital Signs Last 24 Hrs  T(C): 37.2 (16 May 2024 04:00), Max: 37.3 (16 May 2024 00:00)  T(F): 99 (16 May 2024 04:00), Max: 99.2 (16 May 2024 00:00)  HR: 71 (16 May 2024 07:00) (37 - 71)  BP: 120/81 (16 May 2024 07:00) (56/45 - 182/71)  BP(mean): 94 (16 May 2024 07:00) (49 - 100)  ABP: --  ABP(mean): --  RR: 13 (16 May 2024 07:00) (13 - 25)  SpO2: 100% (16 May 2024 07:00) (90% - 100%)    O2 Parameters below as of 16 May 2024 07:00  Patient On (Oxygen Delivery Method): nasal cannula  O2 Flow (L/min): 3          PHYSICAL EXAMINATION  Appearance: NAD, no distress  HEENT: Moist Mucous Membranes, Anicteric, PERRL, EOMI  Cardiovascular: Regular rate and rhythm, Normal S1 S2, No JVD, No murmurs  Respiratory: Lungs clear to auscultation. No rales, No rhonchi, No wheezing. No tenderness to palpation  Gastrointestinal:  Soft, Non-tender, + BS  Neurologic: Non-focal, A&Ox3  Skin: Warm and dry, No rashes, No ecchymosis, No cyanosis  Musculoskeletal: No clubbing, No cyanosis, No edema  Vascular: Peripheral pulses palpable 2+ bilaterally  Psychiatry: Mood & affect appropriate      	    		      I&O's Summary    15 May 2024 07:01  -  16 May 2024 07:00  --------------------------------------------------------  IN: 60.6 mL / OUT: 1265 mL / NET: -1204.4 mL    16 May 2024 07:01  -  16 May 2024 08:05  --------------------------------------------------------  IN: 2 mL / OUT: 400 mL / NET: -398 mL    	 	  LABORATORY VALUES	 	                          12.3   9.19  )-----------( 132      ( 16 May 2024 00:20 )             38.8       05-16    140  |  107  |  37<H>  ----------------------------<  134<H>  5.2   |  21<L>  |  1.73<H>  05-16    135  |  104  |  37<H>  ----------------------------<  140<H>  6.1<H>   |  16<L>  |  1.64<H>    Ca    9.0      16 May 2024 06:00  Ca    9.1      16 May 2024 00:20  Phos  4.4     05-16  Phos  4.4     05-16  Mg     2.10     05-16  Mg     2.10     05-16    TPro  6.0  /  Alb  3.4  /  TBili  0.5  /  DBili  x   /  AST  51<H>  /  ALT  47<H>  /  AlkPhos  81  05-15  TPro  6.2  /  Alb  3.7  /  TBili  0.2  /  DBili  x   /  AST  17  /  ALT  14  /  AlkPhos  73  05-15    LIVER FUNCTIONS - ( 15 May 2024 23:32 )  Alb: 3.4 g/dL / Pro: 6.0 g/dL / ALK PHOS: 81 U/L / ALT: 47 U/L / AST: 51 U/L / GGT: x           Activated Partial Thromboplastin Time: 35.7 sec (05-16 @ 00:20)      CARDIAC MARKERS:            Lactate, Blood: 2.6 mmol/L (05-15 @ 23:32)  Blood Gas Venous - Lactate: 2.4 mmol/L (05-15 @ 19:11)    05-16 @ 00:20  Cholesterol, Serum - 102  Direct LDL- --  HDL Cholesterol, Serum- 43  Triglycerides, Serum- 65      T4, Serum: 5.70 ug/dL (05-16 @ 00:20)      Urinalysis Basic - ( 16 May 2024 06:00 )    Color: x / Appearance: x / SG: x / pH: x  Gluc: 134 mg/dL / Ketone: x  / Bili: x / Urobili: x   Blood: x / Protein: x / Nitrite: x   Leuk Esterase: x / RBC: x / WBC x   Sq Epi: x / Non Sq Epi: x / Bacteria: x      CAPILLARY BLOOD GLUCOSE      POCT Blood Glucose.: 140 mg/dL (16 May 2024 05:14)            LABS:                          12.3   9.19  )-----------( 132      ( 16 May 2024 00:20 )             38.8     PT/INR - ( 16 May 2024 00:20 )   PT: 13.8 sec;   INR: 1.24 ratio         PTT - ( 16 May 2024 00:20 )  PTT:35.7 sec  16 May 2024 06:00    140    |  107    |  37<H>  ----------------------------<  134<H>  5.2     |  21<L>  |  1.73<H>  16 May 2024 00:20    135    |  104    |  37<H>  ----------------------------<  140<H>  6.1<H>   |  16<L>  |  1.64<H>    Ca    9.0        16 May 2024 06:00  Ca    9.1        16 May 2024 00:20  Phos  4.4       16 May 2024 06:00  Phos  4.4       16 May 2024 00:20  Mg     2.10      16 May 2024 06:00  Mg     2.10      16 May 2024 00:20    TPro  6.0    /  Alb  3.4    /  TBili  0.5    /  DBili  x      /  AST  51<H>  /  ALT  47<H>  /  AlkPhos  81     15 May 2024 23:32  TPro  6.2    /  Alb  3.7    /  TBili  0.2    /  DBili  x      /  AST  17     /  ALT  14     /  AlkPhos  73     15 May 2024 19:11                      EKG:  Diagnostic testing:  cath:  echo:      Assessment and Plan:

## 2024-05-16 NOTE — DIETITIAN INITIAL EVALUATION ADULT - NS FNS DIET ORDER
Consistent Carbohydrate/No Snacks:    Supplement Feeding Modality:  Oral Glucerna Shake Cans or Servings Per Day:  2       Frequency:  Daily (05-16-24 @ 11:23

## 2024-05-16 NOTE — PATIENT PROFILE ADULT - FALL HARM RISK - HARM RISK INTERVENTIONS

## 2024-05-16 NOTE — PROGRESS NOTE ADULT - PROBLEM SELECTOR PLAN 5
CHADSVASC 4  Heparin gtt Full AC if CT Scan Head is negative, continue to hold Xarelto if TVP needed  Hold Nevibolol and Multaq

## 2024-05-16 NOTE — PROGRESS NOTE ADULT - CRITICAL CARE ATTENDING COMMENT
83 year old man with known AF HTN HLD who presented from home to ED with altered mental status in setting of hypotension and junctional bradycardia. On Multaq and Nebivolol as an outpatient  SP glucagon    -check TTE  -hold beta blocker  -attempt to contact outpatient providers

## 2024-05-16 NOTE — PROGRESS NOTE ADULT - SUBJECTIVE AND OBJECTIVE BOX
Subjective/Objective:     Overnight event:  Tele event:    MEDICATIONS    norepinephrine Infusion 0.05 MICROgram(s)/kG/Min IV Continuous <Continuous>    piperacillin/tazobactam IVPB.. 3.375 Gram(s) IV Intermittent every 12 hours          atorvastatin 20 milliGRAM(s) Oral at bedtime  dextrose 50% Injectable 25 Gram(s) IV Push once  dextrose 50% Injectable 12.5 Gram(s) IV Push once  dextrose Oral Gel 15 Gram(s) Oral once PRN  glucagon  Injectable 1 milliGRAM(s) IntraMuscular once  insulin lispro (ADMELOG) corrective regimen sliding scale   SubCutaneous every 6 hours    chlorhexidine 2% Cloths 1 Application(s) Topical <User Schedule>  dextrose 10% Bolus 125 milliLiter(s) IV Bolus once  dextrose 5%. 1000 milliLiter(s) IV Continuous <Continuous>  dextrose 5%. 1000 milliLiter(s) IV Continuous <Continuous>            REVIEW OF SYSTEMS:  Complete 10point ROS negative.    ICU Vital Signs Last 24 Hrs  T(C): 36.9 (16 May 2024 08:00), Max: 37.3 (16 May 2024 00:00)  T(F): 98.5 (16 May 2024 08:00), Max: 99.2 (16 May 2024 00:00)  HR: 50 (16 May 2024 08:30) (37 - 71)  BP: 96/49 (16 May 2024 08:30) (56/45 - 182/71)  BP(mean): 63 (16 May 2024 08:30) (49 - 100)  ABP: --  ABP(mean): --  RR: 14 (16 May 2024 08:30) (13 - 25)  SpO2: 90% (16 May 2024 08:30) (90% - 100%)    O2 Parameters below as of 16 May 2024 08:00  Patient On (Oxygen Delivery Method): room air            PHYSICAL EXAMINATION  Appearance: NAD, no distress  HEENT: Moist Mucous Membranes, Anicteric, PERRL, EOMI  Cardiovascular: Regular rate and rhythm, Normal S1 S2, No JVD, No murmurs  Respiratory: Lungs clear to auscultation. No rales, No rhonchi, No wheezing. No tenderness to palpation  Gastrointestinal:  Soft, Non-tender, + BS  Neurologic: Non-focal, A&Ox3  Skin: Warm and dry, No rashes, No ecchymosis, No cyanosis  Musculoskeletal: No clubbing, No cyanosis, No edema  Vascular: Peripheral pulses palpable 2+ bilaterally  Psychiatry: Mood & affect appropriate      	    		      I&O's Summary    15 May 2024 07:01  -  16 May 2024 07:00  --------------------------------------------------------  IN: 60.6 mL / OUT: 1265 mL / NET: -1204.4 mL    16 May 2024 07:01  -  16 May 2024 09:02  --------------------------------------------------------  IN: 2 mL / OUT: 400 mL / NET: -398 mL    	 	  LABORATORY VALUES	 	                          12.3   9.19  )-----------( 132      ( 16 May 2024 00:20 )             38.8       05-16    140  |  107  |  37<H>  ----------------------------<  134<H>  5.2   |  21<L>  |  1.73<H>  05-16    135  |  104  |  37<H>  ----------------------------<  140<H>  6.1<H>   |  16<L>  |  1.64<H>    Ca    9.0      16 May 2024 06:00  Ca    9.1      16 May 2024 00:20  Phos  4.4     05-16  Phos  4.4     05-16  Mg     2.10     05-16  Mg     2.10     05-16    TPro  6.0  /  Alb  3.4  /  TBili  0.5  /  DBili  x   /  AST  51<H>  /  ALT  47<H>  /  AlkPhos  81  05-15  TPro  6.2  /  Alb  3.7  /  TBili  0.2  /  DBili  x   /  AST  17  /  ALT  14  /  AlkPhos  73  05-15    LIVER FUNCTIONS - ( 15 May 2024 23:32 )  Alb: 3.4 g/dL / Pro: 6.0 g/dL / ALK PHOS: 81 U/L / ALT: 47 U/L / AST: 51 U/L / GGT: x           Activated Partial Thromboplastin Time: 35.7 sec (05-16 @ 00:20)      CARDIAC MARKERS:            Lactate, Blood: 2.6 mmol/L (05-15 @ 23:32)  Blood Gas Venous - Lactate: 2.4 mmol/L (05-15 @ 19:11)    05-16 @ 00:20  Cholesterol, Serum - 102  Direct LDL- --  HDL Cholesterol, Serum- 43  Triglycerides, Serum- 65      T4, Serum: 5.70 ug/dL (05-16 @ 00:20)      Urinalysis Basic - ( 16 May 2024 06:00 )    Color: x / Appearance: x / SG: x / pH: x  Gluc: 134 mg/dL / Ketone: x  / Bili: x / Urobili: x   Blood: x / Protein: x / Nitrite: x   Leuk Esterase: x / RBC: x / WBC x   Sq Epi: x / Non Sq Epi: x / Bacteria: x      CAPILLARY BLOOD GLUCOSE      POCT Blood Glucose.: 140 mg/dL (16 May 2024 05:14)            LABS:                          12.3   9.19  )-----------( 132      ( 16 May 2024 00:20 )             38.8     PT/INR - ( 16 May 2024 00:20 )   PT: 13.8 sec;   INR: 1.24 ratio         PTT - ( 16 May 2024 00:20 )  PTT:35.7 sec  16 May 2024 06:00    140    |  107    |  37<H>  ----------------------------<  134<H>  5.2     |  21<L>  |  1.73<H>  16 May 2024 00:20    135    |  104    |  37<H>  ----------------------------<  140<H>  6.1<H>   |  16<L>  |  1.64<H>    Ca    9.0        16 May 2024 06:00  Ca    9.1        16 May 2024 00:20  Phos  4.4       16 May 2024 06:00  Phos  4.4       16 May 2024 00:20  Mg     2.10      16 May 2024 06:00  Mg     2.10      16 May 2024 00:20    TPro  6.0    /  Alb  3.4    /  TBili  0.5    /  DBili  x      /  AST  51<H>  /  ALT  47<H>  /  AlkPhos  81     15 May 2024 23:32  TPro  6.2    /  Alb  3.7    /  TBili  0.2    /  DBili  x      /  AST  17     /  ALT  14     /  AlkPhos  73     15 May 2024 19:11                      EKG:  Diagnostic testing:  cath:  echo:      Assessment and Plan:         Subjective/Objective:   Admitted overnight for symptomatic bradycardia and hypotension requiring levo, s/p glucagon x 2, lasix x1, IVF 1L.   This AM patient no longer requiring levophed, otherwise subjectively asymptomatic aside from a dry cough that he notes is bronchitis. Beckie any chest pain, shortness of breath, fever/chills, NVD. Sitting up in bed and eating.    Tele event: NSR with intermittent episodes of sinus bradycardia on tele.    MEDICATIONS    norepinephrine Infusion 0.05 MICROgram(s)/kG/Min IV Continuous <Continuous>  piperacillin/tazobactam IVPB.. 3.375 Gram(s) IV Intermittent every 12 hours  atorvastatin 20 milliGRAM(s) Oral at bedtime  dextrose 50% Injectable 25 Gram(s) IV Push once  dextrose 50% Injectable 12.5 Gram(s) IV Push once  dextrose Oral Gel 15 Gram(s) Oral once PRN  glucagon  Injectable 1 milliGRAM(s) IntraMuscular once  insulin lispro (ADMELOG) corrective regimen sliding scale   SubCutaneous every 6 hours    chlorhexidine 2% Cloths 1 Application(s) Topical <User Schedule>  dextrose 10% Bolus 125 milliLiter(s) IV Bolus once  dextrose 5%. 1000 milliLiter(s) IV Continuous <Continuous>  dextrose 5%. 1000 milliLiter(s) IV Continuous <Continuous>            REVIEW OF SYSTEMS:  See above    ICU Vital Signs Last 24 Hrs  T(C): 36.9 (16 May 2024 08:00), Max: 37.3 (16 May 2024 00:00)  T(F): 98.5 (16 May 2024 08:00), Max: 99.2 (16 May 2024 00:00)  HR: 50 (16 May 2024 08:30) (37 - 71)  BP: 96/49 (16 May 2024 08:30) (56/45 - 182/71)  BP(mean): 63 (16 May 2024 08:30) (49 - 100)  ABP: --  ABP(mean): --  RR: 14 (16 May 2024 08:30) (13 - 25)  SpO2: 90% (16 May 2024 08:30) (90% - 100%)    O2 Parameters below as of 16 May 2024 08:00  Patient On (Oxygen Delivery Method): room air            PHYSICAL EXAMINATION  Appearance: NAD, no distress  HEENT: Moist Mucous Membranes, Anicteric, PERRL, EOMI  Cardiovascular: Regular rate and rhythm, Normal S1 S2, No JVD, No murmurs  Respiratory: Lungs clear to auscultation. No rales, No rhonchi, No wheezing. No tenderness to palpation  Gastrointestinal:  Soft, Non-tender, + BS  Neurologic: Non-focal, A&Ox3, some baseline confusion   Skin: Warm and dry, No rashes, No ecchymosis, No cyanosis  Musculoskeletal: No clubbing, No cyanosis, No edema  Vascular: Peripheral pulses palpable 2+ bilaterally  Psychiatry: Mood & affect appropriate  : +carolina, 100-200cc/h      	    		      I&O's Summary    15 May 2024 07:01  -  16 May 2024 07:00  --------------------------------------------------------  IN: 60.6 mL / OUT: 1265 mL / NET: -1204.4 mL    16 May 2024 07:01  -  16 May 2024 09:02  --------------------------------------------------------  IN: 2 mL / OUT: 400 mL / NET: -398 mL    	 	  LABORATORY VALUES	 	                          12.3   9.19  )-----------( 132      ( 16 May 2024 00:20 )             38.8       05-16    140  |  107  |  37<H>  ----------------------------<  134<H>  5.2   |  21<L>  |  1.73<H>  05-16    135  |  104  |  37<H>  ----------------------------<  140<H>  6.1<H>   |  16<L>  |  1.64<H>    Ca    9.0      16 May 2024 06:00  Ca    9.1      16 May 2024 00:20  Phos  4.4     05-16  Phos  4.4     05-16  Mg     2.10     05-16  Mg     2.10     05-16    TPro  6.0  /  Alb  3.4  /  TBili  0.5  /  DBili  x   /  AST  51<H>  /  ALT  47<H>  /  AlkPhos  81  05-15  TPro  6.2  /  Alb  3.7  /  TBili  0.2  /  DBili  x   /  AST  17  /  ALT  14  /  AlkPhos  73  05-15    LIVER FUNCTIONS - ( 15 May 2024 23:32 )  Alb: 3.4 g/dL / Pro: 6.0 g/dL / ALK PHOS: 81 U/L / ALT: 47 U/L / AST: 51 U/L / GGT: x           Activated Partial Thromboplastin Time: 35.7 sec (05-16 @ 00:20)      CARDIAC MARKERS:            Lactate, Blood: 2.6 mmol/L (05-15 @ 23:32)  Blood Gas Venous - Lactate: 2.4 mmol/L (05-15 @ 19:11)    05-16 @ 00:20  Cholesterol, Serum - 102  Direct LDL- --  HDL Cholesterol, Serum- 43  Triglycerides, Serum- 65      T4, Serum: 5.70 ug/dL (05-16 @ 00:20)      Urinalysis Basic - ( 16 May 2024 06:00 )    Color: x / Appearance: x / SG: x / pH: x  Gluc: 134 mg/dL / Ketone: x  / Bili: x / Urobili: x   Blood: x / Protein: x / Nitrite: x   Leuk Esterase: x / RBC: x / WBC x   Sq Epi: x / Non Sq Epi: x / Bacteria: x      CAPILLARY BLOOD GLUCOSE      POCT Blood Glucose.: 140 mg/dL (16 May 2024 05:14)            LABS:                          12.3   9.19  )-----------( 132      ( 16 May 2024 00:20 )             38.8     PT/INR - ( 16 May 2024 00:20 )   PT: 13.8 sec;   INR: 1.24 ratio         PTT - ( 16 May 2024 00:20 )  PTT:35.7 sec  16 May 2024 06:00    140    |  107    |  37<H>  ----------------------------<  134<H>  5.2     |  21<L>  |  1.73<H>  16 May 2024 00:20    135    |  104    |  37<H>  ----------------------------<  140<H>  6.1<H>   |  16<L>  |  1.64<H>    Ca    9.0        16 May 2024 06:00  Ca    9.1        16 May 2024 00:20  Phos  4.4       16 May 2024 06:00  Phos  4.4       16 May 2024 00:20  Mg     2.10      16 May 2024 06:00  Mg     2.10      16 May 2024 00:20    TPro  6.0    /  Alb  3.4    /  TBili  0.5    /  DBili  x      /  AST  51<H>  /  ALT  47<H>  /  AlkPhos  81     15 May 2024 23:32  TPro  6.2    /  Alb  3.7    /  TBili  0.2    /  DBili  x      /  AST  17     /  ALT  14     /  AlkPhos  73     15 May 2024 19:11                      EKG: junctional bradycardia  Diagnostic testing:  cath:  echo:

## 2024-05-16 NOTE — PROGRESS NOTE ADULT - PROBLEM SELECTOR PLAN 4
possibly from low flow state 2nd to bradycardia  hold nephrotoxic agents  if creatinine continues to rise consider Renal US and Nephrology consult

## 2024-05-17 LAB
ALBUMIN SERPL ELPH-MCNC: 3.6 G/DL — SIGNIFICANT CHANGE UP (ref 3.3–5)
ALP SERPL-CCNC: 78 U/L — SIGNIFICANT CHANGE UP (ref 40–120)
ALT FLD-CCNC: 37 U/L — SIGNIFICANT CHANGE UP (ref 4–41)
ANION GAP SERPL CALC-SCNC: 9 MMOL/L — SIGNIFICANT CHANGE UP (ref 7–14)
APTT BLD: 154.5 SEC — SIGNIFICANT CHANGE UP (ref 24.5–35.6)
APTT BLD: 83.5 SEC — HIGH (ref 24.5–35.6)
APTT BLD: 95.7 SEC — HIGH (ref 24.5–35.6)
AST SERPL-CCNC: 27 U/L — SIGNIFICANT CHANGE UP (ref 4–40)
BILIRUB SERPL-MCNC: 0.5 MG/DL — SIGNIFICANT CHANGE UP (ref 0.2–1.2)
BUN SERPL-MCNC: 32 MG/DL — HIGH (ref 7–23)
CALCIUM SERPL-MCNC: 9.4 MG/DL — SIGNIFICANT CHANGE UP (ref 8.4–10.5)
CHLORIDE SERPL-SCNC: 106 MMOL/L — SIGNIFICANT CHANGE UP (ref 98–107)
CO2 SERPL-SCNC: 23 MMOL/L — SIGNIFICANT CHANGE UP (ref 22–31)
CREAT SERPL-MCNC: 1.38 MG/DL — HIGH (ref 0.5–1.3)
EGFR: 50 ML/MIN/1.73M2 — LOW
GLUCOSE BLDC GLUCOMTR-MCNC: 109 MG/DL — HIGH (ref 70–99)
GLUCOSE BLDC GLUCOMTR-MCNC: 124 MG/DL — HIGH (ref 70–99)
GLUCOSE BLDC GLUCOMTR-MCNC: 132 MG/DL — HIGH (ref 70–99)
GLUCOSE BLDC GLUCOMTR-MCNC: 141 MG/DL — HIGH (ref 70–99)
GLUCOSE SERPL-MCNC: 174 MG/DL — HIGH (ref 70–99)
HCT VFR BLD CALC: 36.1 % — LOW (ref 39–50)
HGB BLD-MCNC: 11.6 G/DL — LOW (ref 13–17)
MAGNESIUM SERPL-MCNC: 1.8 MG/DL — SIGNIFICANT CHANGE UP (ref 1.6–2.6)
MCHC RBC-ENTMCNC: 26.2 PG — LOW (ref 27–34)
MCHC RBC-ENTMCNC: 32.1 GM/DL — SIGNIFICANT CHANGE UP (ref 32–36)
MCV RBC AUTO: 81.7 FL — SIGNIFICANT CHANGE UP (ref 80–100)
NRBC # BLD: 0 /100 WBCS — SIGNIFICANT CHANGE UP (ref 0–0)
NRBC # FLD: 0 K/UL — SIGNIFICANT CHANGE UP (ref 0–0)
PHOSPHATE SERPL-MCNC: 2.8 MG/DL — SIGNIFICANT CHANGE UP (ref 2.5–4.5)
PLATELET # BLD AUTO: 126 K/UL — LOW (ref 150–400)
POTASSIUM SERPL-MCNC: 5.6 MMOL/L — HIGH (ref 3.5–5.3)
POTASSIUM SERPL-SCNC: 5.6 MMOL/L — HIGH (ref 3.5–5.3)
PROT SERPL-MCNC: 6.6 G/DL — SIGNIFICANT CHANGE UP (ref 6–8.3)
RBC # BLD: 4.42 M/UL — SIGNIFICANT CHANGE UP (ref 4.2–5.8)
RBC # FLD: 14.4 % — SIGNIFICANT CHANGE UP (ref 10.3–14.5)
SODIUM SERPL-SCNC: 138 MMOL/L — SIGNIFICANT CHANGE UP (ref 135–145)
WBC # BLD: 6.94 K/UL — SIGNIFICANT CHANGE UP (ref 3.8–10.5)
WBC # FLD AUTO: 6.94 K/UL — SIGNIFICANT CHANGE UP (ref 3.8–10.5)

## 2024-05-17 PROCEDURE — 99233 SBSQ HOSP IP/OBS HIGH 50: CPT

## 2024-05-17 PROCEDURE — 93010 ELECTROCARDIOGRAM REPORT: CPT

## 2024-05-17 PROCEDURE — 99231 SBSQ HOSP IP/OBS SF/LOW 25: CPT

## 2024-05-17 RX ORDER — SODIUM ZIRCONIUM CYCLOSILICATE 10 G/10G
5 POWDER, FOR SUSPENSION ORAL ONCE
Refills: 0 | Status: COMPLETED | OUTPATIENT
Start: 2024-05-17 | End: 2024-05-17

## 2024-05-17 RX ORDER — HALOPERIDOL DECANOATE 100 MG/ML
1 INJECTION INTRAMUSCULAR ONCE
Refills: 0 | Status: COMPLETED | OUTPATIENT
Start: 2024-05-17 | End: 2024-05-17

## 2024-05-17 RX ORDER — LANOLIN ALCOHOL/MO/W.PET/CERES
3 CREAM (GRAM) TOPICAL AT BEDTIME
Refills: 0 | Status: DISCONTINUED | OUTPATIENT
Start: 2024-05-17 | End: 2024-05-22

## 2024-05-17 RX ORDER — ZALEPLON 10 MG
5 CAPSULE ORAL ONCE
Refills: 0 | Status: DISCONTINUED | OUTPATIENT
Start: 2024-05-17 | End: 2024-05-18

## 2024-05-17 RX ORDER — SODIUM CHLORIDE 9 MG/ML
1000 INJECTION INTRAMUSCULAR; INTRAVENOUS; SUBCUTANEOUS
Refills: 0 | Status: DISCONTINUED | OUTPATIENT
Start: 2024-05-17 | End: 2024-05-22

## 2024-05-17 RX ADMIN — HALOPERIDOL DECANOATE 1 MILLIGRAM(S): 100 INJECTION INTRAMUSCULAR at 16:54

## 2024-05-17 RX ADMIN — HEPARIN SODIUM 0 UNIT(S)/HR: 5000 INJECTION INTRAVENOUS; SUBCUTANEOUS at 06:32

## 2024-05-17 RX ADMIN — CHLORHEXIDINE GLUCONATE 1 APPLICATION(S): 213 SOLUTION TOPICAL at 05:17

## 2024-05-17 RX ADMIN — SODIUM CHLORIDE 100 MILLILITER(S): 9 INJECTION INTRAMUSCULAR; INTRAVENOUS; SUBCUTANEOUS at 19:40

## 2024-05-17 RX ADMIN — HEPARIN SODIUM 1000 UNIT(S)/HR: 5000 INJECTION INTRAVENOUS; SUBCUTANEOUS at 07:48

## 2024-05-17 RX ADMIN — HEPARIN SODIUM 1000 UNIT(S)/HR: 5000 INJECTION INTRAVENOUS; SUBCUTANEOUS at 23:47

## 2024-05-17 RX ADMIN — SODIUM ZIRCONIUM CYCLOSILICATE 5 GRAM(S): 10 POWDER, FOR SUSPENSION ORAL at 13:13

## 2024-05-17 RX ADMIN — HEPARIN SODIUM 1000 UNIT(S)/HR: 5000 INJECTION INTRAVENOUS; SUBCUTANEOUS at 19:38

## 2024-05-17 RX ADMIN — HEPARIN SODIUM 1000 UNIT(S)/HR: 5000 INJECTION INTRAVENOUS; SUBCUTANEOUS at 16:03

## 2024-05-17 RX ADMIN — HEPARIN SODIUM 1000 UNIT(S)/HR: 5000 INJECTION INTRAVENOUS; SUBCUTANEOUS at 10:04

## 2024-05-17 RX ADMIN — HEPARIN SODIUM 1000 UNIT(S)/HR: 5000 INJECTION INTRAVENOUS; SUBCUTANEOUS at 08:42

## 2024-05-17 NOTE — PROGRESS NOTE ADULT - ASSESSMENT
83yoM w/ PMHx PAfib on Xarelto 20mg, HTN, HLD, DMII and CKD initially presented to the ED with AMS found to be hypotensive and bradycardic and was started on Levophed.  EKG consistent with junctional bradycardia down to 30's and lab work significant for elevated creatinine 1.7 (baseline 0.9) and hyperkalemic 6.0.  Was recently seen 2 months ago for PNA and was discharged to home.  CTH- no acute findings.  Was subsequently admitted to CCU for further monitoring.  On telemetry overnight was in NSR/junctional rhythm.  Remains on levophed. Patient's home meds Multaq and beta-blocker has been held since the admission.  His junctional rhythm resolved now,  Telemetry demonstrates NSR 60-80's bpm.  Remains hemodynamically stable     Junctional bradycardia-resolved     Continue to monitor on telemetry    Echo:  normal LVEF 61%  Continue Xarelto for for thromboembolic prophylaxis, elevated CHADS2-VASC2 score is 4 ,   Keep K>4 Mg>2  Avoid AV joan blockers (was on BB at home)  No pacemaker indicated as his symptomatic bradycardic reversible off BB/Multaq  83yoM w/ PMHx PAfib on Xarelto 20mg, HTN, HLD, DMII and CKD initially presented to the ED with AMS found to be hypotensive and bradycardic and was started on Levophed.  EKG consistent with junctional bradycardia down to 30's and lab work significant for elevated creatinine 1.7 (baseline 0.9) and hyperkalemic 6.0.  Was recently seen 2 months ago for PNA and was discharged to home.  CTH- no acute findings.  Was subsequently admitted to CCU for further monitoring.  On telemetry overnight was in NSR/junctional rhythm.  Remains on levophed. Patient's home meds Multaq and beta-blocker has been held since the admission.  His junctional rhythm resolved now,  Telemetry demonstrates NSR 60-80's bpm.  Remains hemodynamically stable     Junctional bradycardia in the setting of hyperkalemia and BB/Multaq use    Continue to monitor on telemetry    Echo:  normal LVEF 61%  Continue Xarelto for for thromboembolic prophylaxis, elevated CHADS2-VASC2 score is 4 ,   Keep K>4 Mg>2  Avoid AV joan blockers (was on BB at home)  No pacemaker indicated as his symptomatic bradycardic reversible off BB/Multaq and correction hyperkalemia

## 2024-05-17 NOTE — OCCUPATIONAL THERAPY INITIAL EVALUATION ADULT - LIVES WITH, PROFILE
Patient lives in a house with family (wife, son, daughter in law, and family) with 4 steps to enter and 1 flight of stairs to bedroom.

## 2024-05-17 NOTE — PROGRESS NOTE ADULT - ASSESSMENT
83M PMH Atrial Fibrillation on Xarelto, Hypertension, Hyperlipidemia, Type 2 Diabetes Mellitus with Diabetic Neuropathy, recently admitted (3/2024) for right upper lobe PNA presents to ED with symptomatic junctional bradycardia (30s), hypotension requiring levophed and altered mental status. Symptomatic junctional rhythm likely in setting of beta blocker toxicity given w/ response to glucagon.  Patient admitted to CCU and required levophed briefly. Now stable off vasopressors with HR and BP normalized.     # Toxin-induced cardiogenic shock, POA    # Beta-blocker toxicity, POA    # Symptomatic bradycardia  - resolved   - Holding AV joan blocking agents and beta blockers (at home on dromidarone 400 and nebivolol 2.5)  - s/p levophed, HR/BP now normalized, lactate normalized  - TSH low, free T4 normal  [ ] f/u TTE     # Chronic Afib  - CHADSVASc: 4   - Home meds: Xarelto  - On heparin gtt for AC; xarelto contraindicated i/s/o ANAMARIA  - plan to transition to Eliquis vs resuming Xarelto pending Cr trend   - plan to d/c OFF AV joan medications due to bradycardia     # Hospital acquired delirium  - AAOx3, but confused and requires frequent reorientation  - maintain normal sleep/ wake cycles     # ANAMARIA  - Cr 1.7 (baseline <1)  - Likely ATN 2/2 hypotension, cr slowly improving   - c/w gentle IVF   - TOV tonight     # Hyperkalemia   - secondary to ANAMARIA  - holding home ACEi  - Sandra, IVF  -monitor on tele     #T2DM  - A1c 7.6  - On low dose insulin sliding scale TID and QHS    DVT ppx: heparin gtt for AC  Diet: CC/Halal  Dispo: pending PT/OT evals      83M PMH Atrial Fibrillation on Xarelto, Hypertension, Hyperlipidemia, Type 2 Diabetes Mellitus with Diabetic Neuropathy, recently admitted (3/2024) for right upper lobe PNA presents to ED with symptomatic junctional bradycardia (30s), hypotension requiring levophed and altered mental status. Symptomatic junctional rhythm likely in setting of beta blocker toxicity given w/ response to glucagon.  Patient admitted to CCU and required levophed briefly. Now stable off vasopressors with HR and BP normalized.     # Toxin-induced cardiogenic shock, POA    # Beta-blocker toxicity, POA    # Symptomatic bradycardia  - resolved   - Holding AV joan blocking agents and beta blockers (at home on dromidarone 400 and nebivolol 2.5)  - s/p levophed, HR/BP now normalized, lactate normalized  - TSH low, free T4 normal  [ ] f/u TTE     # Chronic Afib  - CHADSVASc: 4   - Home meds: Xarelto, dromidarone, and nebivolol  - On heparin gtt for AC; xarelto contraindicated i/s/o ANAMARIA  - plan to transition to Eliquis vs resuming Xarelto pending Cr trend   - plan to d/c OFF AV joan medications due to bradycardia     # Hospital acquired delirium  - AAOx3, but confused and requires frequent reorientation  - maintain normal sleep/ wake cycles     # ANAMARIA  - Cr 1.7 (baseline <1)  - Likely ATN 2/2 hypotension, cr slowly improving   - c/w gentle IVF   - TOV tonight     # Hyperkalemia   - secondary to ANAMARIA  - holding home ACEi  - Sandra, IVF  -monitor on tele     #T2DM  - A1c 7.6  - On low dose insulin sliding scale TID and QHS    DVT ppx: heparin gtt for AC  Diet: CC/Halal  Dispo: pending PT/OT evals

## 2024-05-17 NOTE — PROGRESS NOTE ADULT - SUBJECTIVE AND OBJECTIVE BOX
Patient is a 83y old  Male who presents with a chief complaint of Bradycardia, hypotension, altered mental status (17 May 2024 09:56)    Javier Theodore MD   [facility] Division of Hospital Medicine   Pager [phone]  Reachable on Microsoft Teams     SUBJECTIVE / OVERNIGHT EVENTS:  Patient seen and examined today.    MEDICATIONS  (STANDING):  atorvastatin 20 milliGRAM(s) Oral at bedtime  chlorhexidine 2% Cloths 1 Application(s) Topical <User Schedule>  dextrose 10% Bolus 125 milliLiter(s) IV Bolus once  dextrose 5%. 1000 milliLiter(s) (50 mL/Hr) IV Continuous <Continuous>  dextrose 5%. 1000 milliLiter(s) (100 mL/Hr) IV Continuous <Continuous>  dextrose 50% Injectable 25 Gram(s) IV Push once  dextrose 50% Injectable 12.5 Gram(s) IV Push once  glucagon  Injectable 1 milliGRAM(s) IntraMuscular once  heparin  Infusion. 1200 Unit(s)/Hr (12 mL/Hr) IV Continuous <Continuous>  insulin lispro (ADMELOG) corrective regimen sliding scale   SubCutaneous three times a day before meals  insulin lispro (ADMELOG) corrective regimen sliding scale   SubCutaneous at bedtime    MEDICATIONS  (PRN):  dextrose Oral Gel 15 Gram(s) Oral once PRN Blood Glucose LESS THAN 70 milliGRAM(s)/deciliter  heparin   Injectable 2500 Unit(s) IV Push every 6 hours PRN For aPTT between 40 - 57  heparin   Injectable 5500 Unit(s) IV Push every 6 hours PRN For aPTT less than 40      Vital Signs Last 24 Hrs  T(C): 36.9 (17 May 2024 15:08), Max: 36.9 (16 May 2024 20:50)  T(F): 98.4 (17 May 2024 15:08), Max: 98.4 (16 May 2024 20:50)  HR: 73 (17 May 2024 15:08) (65 - 87)  BP: 138/74 (17 May 2024 15:08) (133/60 - 138/74)  BP(mean): --  RR: 18 (17 May 2024 15:08) (18 - 19)  SpO2: 98% (17 May 2024 15:08) (97% - 98%)  CAPILLARY BLOOD GLUCOSE      POCT Blood Glucose.: 141 mg/dL (17 May 2024 12:04)  POCT Blood Glucose.: 132 mg/dL (17 May 2024 08:11)  POCT Blood Glucose.: 182 mg/dL (16 May 2024 20:47)  POCT Blood Glucose.: 99 mg/dL (16 May 2024 17:32)    I&O's Summary    16 May 2024 07:01  -  17 May 2024 07:00  --------------------------------------------------------  IN: 2 mL / OUT: 1700 mL / NET: -1698 mL        General: elderly man laying in bed appears comfortable in NAD, awake and alert  HENMT: NCAT, MMM  Respiratory: No respiratory distress, CTABL,   Cardiovascular: S1,S2; Regular rate and rhythm; No m/g/r.  Gastrointestinal: Soft, Nontender, Nondistended; +BS.   : figueroa in place   Extremities: No c/c/e; warm to touch  Neurological: Moving all 4 extremities; Sensation to LT grossly in tact.  Psych: AAOx3; appropriate mood and affect    LABS:                        11.6   6.94  )-----------( 126      ( 17 May 2024 03:51 )             36.1     05-17    138  |  106  |  32<H>  ----------------------------<  174<H>  5.6<H>   |  23  |  1.38<H>    Ca    9.4      17 May 2024 10:00  Phos  2.8     05-17  Mg     1.80     05-17    TPro  6.6  /  Alb  3.6  /  TBili  0.5  /  DBili  x   /  AST  27  /  ALT  37  /  AlkPhos  78  05-17    PT/INR - ( 16 May 2024 00:20 )   PT: 13.8 sec;   INR: 1.24 ratio         PTT - ( 17 May 2024 13:47 )  PTT:83.5 sec      Urinalysis Basic - ( 17 May 2024 10:00 )    Color: x / Appearance: x / SG: x / pH: x  Gluc: 174 mg/dL / Ketone: x  / Bili: x / Urobili: x   Blood: x / Protein: x / Nitrite: x   Leuk Esterase: x / RBC: x / WBC x   Sq Epi: x / Non Sq Epi: x / Bacteria: x        RADIOLOGY & ADDITIONAL TESTS:    Imaging Personally Reviewed:    Consultant(s) Notes Reviewed:      Care Discussed with Consultants/Other Providers:

## 2024-05-17 NOTE — PROGRESS NOTE ADULT - SUBJECTIVE AND OBJECTIVE BOX
Patient is a 83y old  Male who presents with a chief complaint of Bradycardia     (16 May 2024 11:15)  Patient denies lightheadedness or dizziness, denies SOB or palpitations.     PAST MEDICAL & SURGICAL HISTORY:  Afib    HTN (hypertension)    DM (diabetes mellitus)    Asthma    No significant past surgical history        MEDICATIONS  (STANDING):  atorvastatin 20 milliGRAM(s) Oral at bedtime  chlorhexidine 2% Cloths 1 Application(s) Topical <User Schedule>  dextrose 10% Bolus 125 milliLiter(s) IV Bolus once  dextrose 5%. 1000 milliLiter(s) (100 mL/Hr) IV Continuous <Continuous>  dextrose 5%. 1000 milliLiter(s) (50 mL/Hr) IV Continuous <Continuous>  dextrose 50% Injectable 25 Gram(s) IV Push once  dextrose 50% Injectable 12.5 Gram(s) IV Push once  glucagon  Injectable 1 milliGRAM(s) IntraMuscular once  heparin  Infusion. 1200 Unit(s)/Hr (12 mL/Hr) IV Continuous <Continuous>  insulin lispro (ADMELOG) corrective regimen sliding scale   SubCutaneous three times a day before meals  insulin lispro (ADMELOG) corrective regimen sliding scale   SubCutaneous at bedtime  sodium zirconium cyclosilicate 5 Gram(s) Oral once    MEDICATIONS  (PRN):  dextrose Oral Gel 15 Gram(s) Oral once PRN Blood Glucose LESS THAN 70 milliGRAM(s)/deciliter  heparin   Injectable 5500 Unit(s) IV Push every 6 hours PRN For aPTT less than 40  heparin   Injectable 2500 Unit(s) IV Push every 6 hours PRN For aPTT between 40 - 57            Vital Signs Last 24 Hrs  T(C): 36.9 (16 May 2024 20:50), Max: 37.1 (16 May 2024 12:30)  T(F): 98.4 (16 May 2024 20:50), Max: 98.8 (16 May 2024 12:30)  HR: 65 (16 May 2024 20:50) (65 - 68)  BP: 133/60 (16 May 2024 20:50) (95/55 - 133/60)  BP(mean): 59 (16 May 2024 11:00) (59 - 67)  RR: 18 (16 May 2024 20:50) (18 - 20)  SpO2: 97% (16 May 2024 20:50) (96% - 97%)    Parameters below as of 16 May 2024 20:50  Patient On (Oxygen Delivery Method): room air                INTERPRETATION OF TELEMETRY:  SR at 60-80's, no junctional cory or AVB seen on tele    ECG: NSR        LABS:                        11.6   6.94  )-----------( 126      ( 17 May 2024 03:51 )             36.1     05-16    140  |  107  |  37<H>  ----------------------------<  134<H>  5.2   |  21<L>  |  1.73<H>    Ca    9.0      16 May 2024 06:00  Phos  4.4     05-16  Mg     2.10     05-16    TPro  6.0  /  Alb  3.4  /  TBili  0.5  /  DBili  x   /  AST  51<H>  /  ALT  47<H>  /  AlkPhos  81  05-15        PT/INR - ( 16 May 2024 00:20 )   PT: 13.8 sec;   INR: 1.24 ratio         PTT - ( 17 May 2024 03:51 )  PTT:154.5 sec  Urinalysis Basic - ( 16 May 2024 06:00 )    Color: x / Appearance: x / SG: x / pH: x  Gluc: 134 mg/dL / Ketone: x  / Bili: x / Urobili: x   Blood: x / Protein: x / Nitrite: x   Leuk Esterase: x / RBC: x / WBC x   Sq Epi: x / Non Sq Epi: x / Bacteria: x        BNP  RADIOLOGY & ADDITIONAL STUDIES:  CONCLUSIONS:      1. Left ventricular systolic function is normal with an ejection fraction of 61 % by Goncalves's method of disks.   2. There is normal LV mass and concentric remodeling.   3. Normal right ventricular cavity size, with normal wall thickness, and normal systolic function.   4. The left atrium is mildly dilated.   5. The right atrium is normal in size.   6. No significant valvular disease.   7. No pericardial effusion seen.   8. Estimated pulmonary artery systolic pressure is 36 mmHg.   9. The inferior vena cava is normal in size (normal <2.1cm) with normal inspiratory collapse (normal >50%) consistent with normal right atrial pressure (~3, range 0-5mmHg).        PHYSICAL EXAM:    GENERAL: In no apparent distress  NECK: Supple and normal thyroid.  No JVD or carotid bruit.  Carotid pulse is 2+ bilaterally.  HEART: Regular rate and rhythm; No murmurs, rubs, or gallops.  PULMONARY: mae basilar rales, no wheezing, or rhonchi bilaterally.  ABDOMEN: Soft, Nontender, Nondistended; Bowel sounds present  EXTREMITIES:  2+ Peripheral Pulses, No clubbing, cyanosis, or edema  NEUROLOGICAL: alert oriented x3 no focal deficit

## 2024-05-17 NOTE — OCCUPATIONAL THERAPY INITIAL EVALUATION ADULT - PERTINENT HX OF CURRENT PROBLEM, REHAB EVAL
83 year old male with history of Atrial Fibrillation, Hypertension, Hyperlipidemia, Type 2 Diabetes Mellitus with Diabetic Neuropathy, recently admitted (3/2024) for right upper lobe PNA presents to ED with bradycardia, hypotension and altered mental status. Now stable off vasopressors with HR and BP normalized.

## 2024-05-17 NOTE — OCCUPATIONAL THERAPY INITIAL EVALUATION ADULT - GENERAL OBSERVATIONS, REHAB EVAL
Patient received semisupine in bed in NAD; agreeable to participate in OT evaluation. +IV. +telemetry monitor. +Chrissy. HR 73 bpm.

## 2024-05-17 NOTE — OCCUPATIONAL THERAPY INITIAL EVALUATION ADULT - ADDITIONAL COMMENTS
Per patient's daughter in law at bedside, patient has had a recent decline in function since being sick in March. Patient has been needing some supervision/assistance as needed with ADLs and has been presenting with confusion.    Following evaluation, patient left semisupine in bed in NAD, HOB 30 degrees. All lines intact. Bed alarm on and call bell in reach.

## 2024-05-18 LAB
ANION GAP SERPL CALC-SCNC: 11 MMOL/L — SIGNIFICANT CHANGE UP (ref 7–14)
APTT BLD: 41.1 SEC — HIGH (ref 24.5–35.6)
BUN SERPL-MCNC: 25 MG/DL — HIGH (ref 7–23)
CALCIUM SERPL-MCNC: 9.1 MG/DL — SIGNIFICANT CHANGE UP (ref 8.4–10.5)
CHLORIDE SERPL-SCNC: 107 MMOL/L — SIGNIFICANT CHANGE UP (ref 98–107)
CO2 SERPL-SCNC: 22 MMOL/L — SIGNIFICANT CHANGE UP (ref 22–31)
CREAT SERPL-MCNC: 1.09 MG/DL — SIGNIFICANT CHANGE UP (ref 0.5–1.3)
EGFR: 67 ML/MIN/1.73M2 — SIGNIFICANT CHANGE UP
GLUCOSE BLDC GLUCOMTR-MCNC: 109 MG/DL — HIGH (ref 70–99)
GLUCOSE BLDC GLUCOMTR-MCNC: 127 MG/DL — HIGH (ref 70–99)
GLUCOSE BLDC GLUCOMTR-MCNC: 172 MG/DL — HIGH (ref 70–99)
GLUCOSE BLDC GLUCOMTR-MCNC: 186 MG/DL — HIGH (ref 70–99)
GLUCOSE SERPL-MCNC: 127 MG/DL — HIGH (ref 70–99)
HCT VFR BLD CALC: 37.2 % — LOW (ref 39–50)
HGB BLD-MCNC: 11.7 G/DL — LOW (ref 13–17)
MAGNESIUM SERPL-MCNC: 1.7 MG/DL — SIGNIFICANT CHANGE UP (ref 1.6–2.6)
MCHC RBC-ENTMCNC: 25.9 PG — LOW (ref 27–34)
MCHC RBC-ENTMCNC: 31.5 GM/DL — LOW (ref 32–36)
MCV RBC AUTO: 82.3 FL — SIGNIFICANT CHANGE UP (ref 80–100)
NRBC # BLD: 0 /100 WBCS — SIGNIFICANT CHANGE UP (ref 0–0)
NRBC # FLD: 0 K/UL — SIGNIFICANT CHANGE UP (ref 0–0)
PHOSPHATE SERPL-MCNC: 3.2 MG/DL — SIGNIFICANT CHANGE UP (ref 2.5–4.5)
PLATELET # BLD AUTO: 135 K/UL — LOW (ref 150–400)
POTASSIUM SERPL-MCNC: 4.9 MMOL/L — SIGNIFICANT CHANGE UP (ref 3.5–5.3)
POTASSIUM SERPL-SCNC: 4.9 MMOL/L — SIGNIFICANT CHANGE UP (ref 3.5–5.3)
RBC # BLD: 4.52 M/UL — SIGNIFICANT CHANGE UP (ref 4.2–5.8)
RBC # FLD: 14.3 % — SIGNIFICANT CHANGE UP (ref 10.3–14.5)
SODIUM SERPL-SCNC: 140 MMOL/L — SIGNIFICANT CHANGE UP (ref 135–145)
WBC # BLD: 6.49 K/UL — SIGNIFICANT CHANGE UP (ref 3.8–10.5)
WBC # FLD AUTO: 6.49 K/UL — SIGNIFICANT CHANGE UP (ref 3.8–10.5)

## 2024-05-18 PROCEDURE — 99232 SBSQ HOSP IP/OBS MODERATE 35: CPT

## 2024-05-18 RX ORDER — TAMSULOSIN HYDROCHLORIDE 0.4 MG/1
0.4 CAPSULE ORAL ONCE
Refills: 0 | Status: COMPLETED | OUTPATIENT
Start: 2024-05-18 | End: 2024-05-21

## 2024-05-18 RX ORDER — IPRATROPIUM/ALBUTEROL SULFATE 18-103MCG
3 AEROSOL WITH ADAPTER (GRAM) INHALATION ONCE
Refills: 0 | Status: COMPLETED | OUTPATIENT
Start: 2024-05-18 | End: 2024-05-18

## 2024-05-18 RX ORDER — RIVAROXABAN 15 MG-20MG
15 KIT ORAL
Refills: 0 | Status: DISCONTINUED | OUTPATIENT
Start: 2024-05-18 | End: 2024-05-22

## 2024-05-18 RX ADMIN — RIVAROXABAN 15 MILLIGRAM(S): KIT at 19:34

## 2024-05-18 RX ADMIN — Medication 5 MILLIGRAM(S): at 02:04

## 2024-05-18 RX ADMIN — Medication 1: at 18:04

## 2024-05-18 RX ADMIN — HEPARIN SODIUM 2500 UNIT(S): 5000 INJECTION INTRAVENOUS; SUBCUTANEOUS at 06:53

## 2024-05-18 RX ADMIN — Medication 3 MILLILITER(S): at 21:35

## 2024-05-18 RX ADMIN — HEPARIN SODIUM 1100 UNIT(S)/HR: 5000 INJECTION INTRAVENOUS; SUBCUTANEOUS at 06:50

## 2024-05-18 RX ADMIN — Medication 3 MILLIGRAM(S): at 22:07

## 2024-05-18 RX ADMIN — HEPARIN SODIUM 1100 UNIT(S)/HR: 5000 INJECTION INTRAVENOUS; SUBCUTANEOUS at 08:14

## 2024-05-18 RX ADMIN — CHLORHEXIDINE GLUCONATE 1 APPLICATION(S): 213 SOLUTION TOPICAL at 05:06

## 2024-05-18 RX ADMIN — ATORVASTATIN CALCIUM 20 MILLIGRAM(S): 80 TABLET, FILM COATED ORAL at 22:07

## 2024-05-18 NOTE — PROGRESS NOTE ADULT - SUBJECTIVE AND OBJECTIVE BOX
Patient is a 83y old  Male who presents with a chief complaint of Bradycardia, hypotension, altered mental status (17 May 2024 15:11)    Javier Theodore MD   [facility] Division of Hospital Medicine   Pager [phone]  Reachable on [employer] Teams     SUBJECTIVE / OVERNIGHT EVENTS:  incomplete     MEDICATIONS  (STANDING):  atorvastatin 20 milliGRAM(s) Oral at bedtime  chlorhexidine 2% Cloths 1 Application(s) Topical <User Schedule>  dextrose 10% Bolus 125 milliLiter(s) IV Bolus once  dextrose 5%. 1000 milliLiter(s) (100 mL/Hr) IV Continuous <Continuous>  dextrose 5%. 1000 milliLiter(s) (50 mL/Hr) IV Continuous <Continuous>  dextrose 50% Injectable 25 Gram(s) IV Push once  dextrose 50% Injectable 12.5 Gram(s) IV Push once  glucagon  Injectable 1 milliGRAM(s) IntraMuscular once  heparin  Infusion. 1200 Unit(s)/Hr (12 mL/Hr) IV Continuous <Continuous>  insulin lispro (ADMELOG) corrective regimen sliding scale   SubCutaneous three times a day before meals  insulin lispro (ADMELOG) corrective regimen sliding scale   SubCutaneous at bedtime  melatonin 3 milliGRAM(s) Oral at bedtime  sodium chloride 0.9%. 1000 milliLiter(s) (100 mL/Hr) IV Continuous <Continuous>    MEDICATIONS  (PRN):  dextrose Oral Gel 15 Gram(s) Oral once PRN Blood Glucose LESS THAN 70 milliGRAM(s)/deciliter  heparin   Injectable 2500 Unit(s) IV Push every 6 hours PRN For aPTT between 40 - 57  heparin   Injectable 5500 Unit(s) IV Push every 6 hours PRN For aPTT less than 40      Vital Signs Last 24 Hrs  T(C): 36.5 (18 May 2024 06:30), Max: 36.9 (17 May 2024 10:00)  T(F): 97.7 (18 May 2024 06:30), Max: 98.4 (17 May 2024 10:00)  HR: 81 (18 May 2024 06:30) (73 - 87)  BP: 163/85 (18 May 2024 06:30) (134/73 - 180/98)  BP(mean): --  RR: 18 (18 May 2024 06:30) (18 - 19)  SpO2: 100% (18 May 2024 06:30) (97% - 100%)  CAPILLARY BLOOD GLUCOSE      POCT Blood Glucose.: 124 mg/dL (17 May 2024 22:30)  POCT Blood Glucose.: 109 mg/dL (17 May 2024 17:24)  POCT Blood Glucose.: 141 mg/dL (17 May 2024 12:04)  POCT Blood Glucose.: 132 mg/dL (17 May 2024 08:11)    I&O's Summary    17 May 2024 07:01  -  18 May 2024 07:00  --------------------------------------------------------  IN: 0 mL / OUT: 2000 mL / NET: -2000 mL      General: elderly man laying in bed appears comfortable in NAD, awake and alert  HENMT: NCAT, MMM  Respiratory: No respiratory distress, CTABL,   Cardiovascular: S1,S2; Regular rate and rhythm; No m/g/r.  Gastrointestinal: Soft, Nontender, Nondistended; +BS.   Extremities: No c/c/e; warm to touch  Neurological: Moving all 4 extremities; Sensation to LT grossly in tact.  Psych: appropriate mood and affect    LABS:                        11.7   6.49  )-----------( 135      ( 18 May 2024 05:00 )             37.2     05-18    140  |  107  |  25<H>  ----------------------------<  127<H>  4.9   |  22  |  1.09    Ca    9.1      18 May 2024 05:00  Phos  3.2     05-18  Mg     1.70     05-18    TPro  6.6  /  Alb  3.6  /  TBili  0.5  /  DBili  x   /  AST  27  /  ALT  37  /  AlkPhos  78  05-17    PTT - ( 18 May 2024 05:00 )  PTT:41.1 sec      Urinalysis Basic - ( 18 May 2024 05:00 )    Color: x / Appearance: x / SG: x / pH: x  Gluc: 127 mg/dL / Ketone: x  / Bili: x / Urobili: x   Blood: x / Protein: x / Nitrite: x   Leuk Esterase: x / RBC: x / WBC x   Sq Epi: x / Non Sq Epi: x / Bacteria: x        RADIOLOGY & ADDITIONAL TESTS:    Imaging Personally Reviewed:    Consultant(s) Notes Reviewed:      Care Discussed with Consultants/Other Providers:   Patient is a 83y old  Male who presents with a chief complaint of Bradycardia, hypotension, altered mental status (17 May 2024 15:11)    Javier Theodore MD   [facility] Division of Hospital Medicine   Pager [phone]  Reachable on Microsoft Teams     SUBJECTIVE / OVERNIGHT EVENTS:  Patient seen and examined this morning. Lowe removed overnight, required straight cath x1  denies any chest/ abdominal pain. States that he has been urinating without issues.     MEDICATIONS  (STANDING):  atorvastatin 20 milliGRAM(s) Oral at bedtime  chlorhexidine 2% Cloths 1 Application(s) Topical <User Schedule>  dextrose 10% Bolus 125 milliLiter(s) IV Bolus once  dextrose 5%. 1000 milliLiter(s) (100 mL/Hr) IV Continuous <Continuous>  dextrose 5%. 1000 milliLiter(s) (50 mL/Hr) IV Continuous <Continuous>  dextrose 50% Injectable 25 Gram(s) IV Push once  dextrose 50% Injectable 12.5 Gram(s) IV Push once  glucagon  Injectable 1 milliGRAM(s) IntraMuscular once  heparin  Infusion. 1200 Unit(s)/Hr (12 mL/Hr) IV Continuous <Continuous>  insulin lispro (ADMELOG) corrective regimen sliding scale   SubCutaneous three times a day before meals  insulin lispro (ADMELOG) corrective regimen sliding scale   SubCutaneous at bedtime  melatonin 3 milliGRAM(s) Oral at bedtime  sodium chloride 0.9%. 1000 milliLiter(s) (100 mL/Hr) IV Continuous <Continuous>    MEDICATIONS  (PRN):  dextrose Oral Gel 15 Gram(s) Oral once PRN Blood Glucose LESS THAN 70 milliGRAM(s)/deciliter  heparin   Injectable 2500 Unit(s) IV Push every 6 hours PRN For aPTT between 40 - 57  heparin   Injectable 5500 Unit(s) IV Push every 6 hours PRN For aPTT less than 40      Vital Signs Last 24 Hrs  T(C): 36.5 (18 May 2024 06:30), Max: 36.9 (17 May 2024 10:00)  T(F): 97.7 (18 May 2024 06:30), Max: 98.4 (17 May 2024 10:00)  HR: 81 (18 May 2024 06:30) (73 - 87)  BP: 163/85 (18 May 2024 06:30) (134/73 - 180/98)  BP(mean): --  RR: 18 (18 May 2024 06:30) (18 - 19)  SpO2: 100% (18 May 2024 06:30) (97% - 100%)  CAPILLARY BLOOD GLUCOSE      POCT Blood Glucose.: 124 mg/dL (17 May 2024 22:30)  POCT Blood Glucose.: 109 mg/dL (17 May 2024 17:24)  POCT Blood Glucose.: 141 mg/dL (17 May 2024 12:04)  POCT Blood Glucose.: 132 mg/dL (17 May 2024 08:11)    I&O's Summary    17 May 2024 07:01  -  18 May 2024 07:00  --------------------------------------------------------  IN: 0 mL / OUT: 2000 mL / NET: -2000 mL      General: elderly man laying in bed appears comfortable in NAD, awake and alert  HENMT: NCAT, MMM  Respiratory: No respiratory distress, CTABL,   Cardiovascular: S1,S2; Regular rate and rhythm; No m/g/r.  Gastrointestinal: Soft, Nontender, Nondistended; +BS.   Extremities: No c/c/e; warm to touch  Neurological: Moving all 4 extremities; Sensation to LT grossly in tact.  Psych: appropriate mood and affect    LABS:                        11.7   6.49  )-----------( 135      ( 18 May 2024 05:00 )             37.2     05-18    140  |  107  |  25<H>  ----------------------------<  127<H>  4.9   |  22  |  1.09    Ca    9.1      18 May 2024 05:00  Phos  3.2     05-18  Mg     1.70     05-18    TPro  6.6  /  Alb  3.6  /  TBili  0.5  /  DBili  x   /  AST  27  /  ALT  37  /  AlkPhos  78  05-17    PTT - ( 18 May 2024 05:00 )  PTT:41.1 sec      Urinalysis Basic - ( 18 May 2024 05:00 )    Color: x / Appearance: x / SG: x / pH: x  Gluc: 127 mg/dL / Ketone: x  / Bili: x / Urobili: x   Blood: x / Protein: x / Nitrite: x   Leuk Esterase: x / RBC: x / WBC x   Sq Epi: x / Non Sq Epi: x / Bacteria: x        RADIOLOGY & ADDITIONAL TESTS:    Imaging Personally Reviewed:    Consultant(s) Notes Reviewed:      Care Discussed with Consultants/Other Providers:

## 2024-05-18 NOTE — PHYSICAL THERAPY INITIAL EVALUATION ADULT - IMPAIRMENTS CONTRIBUTING TO GAIT DEVIATIONS, PT EVAL
pt unsteady with retropulsion/impaired balance/impaired coordination/impaired motor control/decreased strength

## 2024-05-18 NOTE — PROGRESS NOTE ADULT - ASSESSMENT
83M PMH Atrial Fibrillation on Xarelto, Hypertension, Hyperlipidemia, Type 2 Diabetes Mellitus with Diabetic Neuropathy, recently admitted (3/2024) for right upper lobe PNA presents to ED with symptomatic junctional bradycardia (30s), hypotension requiring levophed and altered mental status. Symptomatic junctional rhythm likely in setting of beta blocker toxicity given w/ response to glucagon.  Patient admitted to CCU and required levophed briefly. Now stable off vasopressors with HR and BP normalized.     # Toxin-induced cardiogenic shock, POA    # Beta-blocker toxicity, POA    # Symptomatic bradycardia  - resolved   - Holding AV joan blocking agents and beta blockers (at home on dromidarone 400 and nebivolol 2.5)  - s/p levophed, HR/BP now normalized, lactate normalized  - TSH low, free T4 normal  [ ] f/u TTE     # Chronic Afib  - CHADSVASc: 4   - Home meds: Xarelto, dromidarone, and nebivolol  - On heparin gtt for AC; xarelto contraindicated i/s/o ANAMARIA  - plan to transition to Eliquis vs resuming Xarelto pending Cr trend   - plan to d/c OFF AV joan medications due to bradycardia     # Hospital acquired delirium  - AAOx3, but confused and requires frequent reorientation  - maintain normal sleep/ wake cycles     # ANAMARIA  - Cr 1.7 (baseline <1)  - Likely ATN 2/2 hypotension, cr slowly improving   - c/w gentle IVF   - TOV in progress     # Hyperkalemia   - secondary to ANAMARIA, resolved   - holding home ACEi  - s/p Sandra, IVF  - monitor on tele     # T2DM  - A1c 7.6  - On low dose insulin sliding scale TID and QHS    DVTppx: heparin gtt for AC  Diet: CC/Halal  Dispo: pending PT/OT evals      83M PMH Atrial Fibrillation on Xarelto, Hypertension, Hyperlipidemia, Type 2 Diabetes Mellitus with Diabetic Neuropathy, recently admitted (3/2024) for right upper lobe PNA presents to ED with symptomatic junctional bradycardia (30s), hypotension requiring levophed and altered mental status. Symptomatic junctional rhythm likely in setting of beta blocker toxicity given w/ response to glucagon.  Patient admitted to CCU and required levophed briefly. Now stable off vasopressors with HR and BP normalized.     # Toxin-induced cardiogenic shock, POA    # Beta-blocker toxicity, POA    # Symptomatic bradycardia  - resolved   - Holding AV joan blocking agents and beta blockers (at home on dromidarone 400 and nebivolol 2.5)  - s/p levophed, HR/BP now normalized, lactate normalized  - TSH low, free T4 normal  - TTE with normal EF and no significant valvular disease     # Chronic Afib  - CHADSVASc: 4   - Home meds: Xarelto, dromidarone, and nebivolol  - resume Xarelto given Cr now back to baseline, dc heparin  - plan to d/c OFF AV joan medications due to bradycardia     # Hospital acquired delirium  - AAOx3, but confused and requires frequent reorientation  - maintain normal sleep/ wake cycles     # ANAMARIA  - Cr 1.7 (baseline <1)  - Likely ATN 2/2 hypotension, cr slowly improving   - c/w gentle IVF   - TOV in progress     # Hyperkalemia   - secondary to ANAMARIA, resolved   - holding home ACEi  - s/p Lokelma, IVF  - monitor on tele     # T2DM  - A1c 7.6  - On low dose insulin sliding scale TID and QHS    DVTppx: heparin gtt for AC  Diet: CC/Halal  Dispo: pending PT/OT evals

## 2024-05-18 NOTE — PHYSICAL THERAPY INITIAL EVALUATION ADULT - GENERAL OBSERVATIONS, REHAB EVAL
Chart reviewed and cleared for PT by JAE Holden. Pt received semi supine in bed in NAD, all lines intact + telemetry, IV, HR 80s.

## 2024-05-18 NOTE — PHYSICAL THERAPY INITIAL EVALUATION ADULT - PERTINENT HX OF CURRENT PROBLEM, REHAB EVAL
16
Pt is a 83 year old male with a past medical history of Atrial Fibrillation on Xarelto, Hypertension, Hyperlipidemia, Type 2 Diabetes Mellitus with Diabetic Neuropathy, recently admitted (3/2024) for right upper lobe PNA presents to ED with symptomatic junctional bradycardia (30s), hypotension requiring levophed and altered mental status. Symptomatic junctional rhythm likely in setting of beta blocker toxicity given w/ response to glucagon.  Patient admitted to CCU and required levophed briefly. Now stable off vasopressors with HR and BP normalized.

## 2024-05-18 NOTE — PHYSICAL THERAPY INITIAL EVALUATION ADULT - ADDITIONAL COMMENTS
Pt lives with family in a house with 4 steps to enter. Pt uses a cane and rolling walker and was independent with ADLs.   Pt left semi supine in bed in NAD, all lines intact, call bell in reach, bed alarm on, nursing staff at bedside and RN Navin made aware.

## 2024-05-19 ENCOUNTER — TRANSCRIPTION ENCOUNTER (OUTPATIENT)
Age: 84
End: 2024-05-19

## 2024-05-19 LAB
ANION GAP SERPL CALC-SCNC: 11 MMOL/L — SIGNIFICANT CHANGE UP (ref 7–14)
BUN SERPL-MCNC: 20 MG/DL — SIGNIFICANT CHANGE UP (ref 7–23)
CALCIUM SERPL-MCNC: 8.9 MG/DL — SIGNIFICANT CHANGE UP (ref 8.4–10.5)
CHLORIDE SERPL-SCNC: 105 MMOL/L — SIGNIFICANT CHANGE UP (ref 98–107)
CO2 SERPL-SCNC: 24 MMOL/L — SIGNIFICANT CHANGE UP (ref 22–31)
CREAT SERPL-MCNC: 1.01 MG/DL — SIGNIFICANT CHANGE UP (ref 0.5–1.3)
EGFR: 73 ML/MIN/1.73M2 — SIGNIFICANT CHANGE UP
GLUCOSE BLDC GLUCOMTR-MCNC: 126 MG/DL — HIGH (ref 70–99)
GLUCOSE BLDC GLUCOMTR-MCNC: 129 MG/DL — HIGH (ref 70–99)
GLUCOSE BLDC GLUCOMTR-MCNC: 146 MG/DL — HIGH (ref 70–99)
GLUCOSE BLDC GLUCOMTR-MCNC: 173 MG/DL — HIGH (ref 70–99)
GLUCOSE SERPL-MCNC: 124 MG/DL — HIGH (ref 70–99)
HCT VFR BLD CALC: 36.8 % — LOW (ref 39–50)
HGB BLD-MCNC: 11.6 G/DL — LOW (ref 13–17)
MAGNESIUM SERPL-MCNC: 1.7 MG/DL — SIGNIFICANT CHANGE UP (ref 1.6–2.6)
MCHC RBC-ENTMCNC: 25.6 PG — LOW (ref 27–34)
MCHC RBC-ENTMCNC: 31.5 GM/DL — LOW (ref 32–36)
MCV RBC AUTO: 81.1 FL — SIGNIFICANT CHANGE UP (ref 80–100)
NRBC # BLD: 0 /100 WBCS — SIGNIFICANT CHANGE UP (ref 0–0)
NRBC # FLD: 0 K/UL — SIGNIFICANT CHANGE UP (ref 0–0)
PHOSPHATE SERPL-MCNC: 3.4 MG/DL — SIGNIFICANT CHANGE UP (ref 2.5–4.5)
PLATELET # BLD AUTO: 122 K/UL — LOW (ref 150–400)
POTASSIUM SERPL-MCNC: 4.3 MMOL/L — SIGNIFICANT CHANGE UP (ref 3.5–5.3)
POTASSIUM SERPL-SCNC: 4.3 MMOL/L — SIGNIFICANT CHANGE UP (ref 3.5–5.3)
RBC # BLD: 4.54 M/UL — SIGNIFICANT CHANGE UP (ref 4.2–5.8)
RBC # FLD: 14.1 % — SIGNIFICANT CHANGE UP (ref 10.3–14.5)
SODIUM SERPL-SCNC: 140 MMOL/L — SIGNIFICANT CHANGE UP (ref 135–145)
WBC # BLD: 5.2 K/UL — SIGNIFICANT CHANGE UP (ref 3.8–10.5)
WBC # FLD AUTO: 5.2 K/UL — SIGNIFICANT CHANGE UP (ref 3.8–10.5)

## 2024-05-19 PROCEDURE — 99232 SBSQ HOSP IP/OBS MODERATE 35: CPT

## 2024-05-19 RX ORDER — IPRATROPIUM/ALBUTEROL SULFATE 18-103MCG
3 AEROSOL WITH ADAPTER (GRAM) INHALATION ONCE
Refills: 0 | Status: DISCONTINUED | OUTPATIENT
Start: 2024-05-19 | End: 2024-05-22

## 2024-05-19 RX ORDER — IPRATROPIUM/ALBUTEROL SULFATE 18-103MCG
3 AEROSOL WITH ADAPTER (GRAM) INHALATION EVERY 6 HOURS
Refills: 0 | Status: DISCONTINUED | OUTPATIENT
Start: 2024-05-19 | End: 2024-05-22

## 2024-05-19 RX ADMIN — Medication 3 MILLIGRAM(S): at 21:17

## 2024-05-19 RX ADMIN — RIVAROXABAN 15 MILLIGRAM(S): KIT at 17:43

## 2024-05-19 RX ADMIN — CHLORHEXIDINE GLUCONATE 1 APPLICATION(S): 213 SOLUTION TOPICAL at 05:50

## 2024-05-19 RX ADMIN — ATORVASTATIN CALCIUM 20 MILLIGRAM(S): 80 TABLET, FILM COATED ORAL at 21:16

## 2024-05-19 NOTE — DISCHARGE NOTE PROVIDER - NSDCCPCAREPLAN_GEN_ALL_CORE_FT
PRINCIPAL DISCHARGE DIAGNOSIS  Diagnosis: Symptomatic bradycardia  Assessment and Plan of Treatment: You were found to have low heart rate thought to be seocndary to your medications. This resolved with holding your meds. Please stop taking Multaq and nebivolol. Please do not take beta blockers without discussing with your cardiologist.     PRINCIPAL DISCHARGE DIAGNOSIS  Diagnosis: Symptomatic bradycardia  Assessment and Plan of Treatment: You were found to have low heart rate thought to be secondary to your medications. You were evaluated by the electrophysiology cardiology team who said that you do not need a pacemaker. Your bradycardia resolved with holding your home medication. Please stop taking Multaq and nebivolol. Please follow-up with cardiology as outpatient.      SECONDARY DISCHARGE DIAGNOSES  Diagnosis: Septic shock  Assessment and Plan of Treatment: You were found to be hypotensive and required pressors in the ICU. Your ultrasound of the heart did not show any acute changes. Please follow-up with your PCP as outpatient for BP check. Please continue to hold home antihypertensives.     PRINCIPAL DISCHARGE DIAGNOSIS  Diagnosis: Symptomatic bradycardia  Assessment and Plan of Treatment: You were found to have low heart rate thought to be secondary to your medications. You were evaluated by the electrophysiology cardiology team who said that you do not need a pacemaker. Your bradycardia resolved with holding your home medication. Please stop taking Multaq and nebivolol. Please follow-up with cardiology as outpatient.      SECONDARY DISCHARGE DIAGNOSES  Diagnosis: Hypotension  Assessment and Plan of Treatment: You were found to be hypotensive and required pressors in the ICU. Your ultrasound of the heart did not show any acute changes. Please follow-up with your PCP as outpatient for BP check. Please continue to hold home antihypertensives.

## 2024-05-19 NOTE — DISCHARGE NOTE PROVIDER - DETAILS OF MALNUTRITION DIAGNOSIS/DIAGNOSES
This patient has been assessed with a concern for Malnutrition and was treated during this hospitalization for the following Nutrition diagnosis/diagnoses:     -  05/16/2024: Severe protein-calorie malnutrition

## 2024-05-19 NOTE — DISCHARGE NOTE PROVIDER - NSDCMRMEDTOKEN_GEN_ALL_CORE_FT
Advair Diskus 250 mcg-50 mcg inhalation powder: 1 puff(s) inhaled every 12 hours  atorvastatin 20 mg oral tablet: 1 tab(s) orally once a day  benazepril 10 mg oral tablet: 1 tab(s) orally once a day  benzonatate 100 mg oral capsule: 1 cap(s) orally 3 times a day  cefuroxime 250 mg oral tablet: 1 tab(s) orally 2 times a day  fluticasone 50 mcg/inh nasal spray: 2 spray(s) in each nostril once a day  gabapentin 300 mg oral capsule: 1 cap(s) orally 3 times a day  guaiFENesin 600 mg oral tablet, extended release: 1 tab(s) orally every 12 hours  Incruse Ellipta 62.5 mcg/inh inhalation powder: 1 inhaler(s) inhaled once a day  ipratropium-albuterol 0.5 mg-2.5 mg/3 mL inhalation solution: 3 milliliter(s) inhaled every 6 hours as needed for  shortness of breath and/or wheezing  Jardiance 10 mg oral tablet: 1 tab(s) orally once a day  metFORMIN 1000 mg oral tablet, extended release: 1 tab(s) orally 2 times a day  Multaq 400 mg oral tablet: 1 tab(s) orally 2 times a day  nebivolol 2.5 mg oral tablet: 1 tab(s) orally once a day  Nebulizer machine to use every 6 hrs for treatment: use every 6 hours for treatment as needed  predniSONE 20 mg oral tablet: 2 tab(s) orally once a day  Xarelto 20 mg oral tablet: 1 tab(s) orally once a day   Advair Diskus 250 mcg-50 mcg inhalation powder: 1 puff(s) inhaled every 12 hours  atorvastatin 20 mg oral tablet: 1 tab(s) orally once a day  Incruse Ellipta 62.5 mcg/inh inhalation powder: 1 inhaler(s) inhaled once a day  insulin lispro 100 units/mL injectable solution: injectable 3 times a day (before meals) Use sliding scale while at rehab:  1 Unit(s) if Glucose 151 - 200  2 Unit(s) if Glucose 201 - 250  3 Unit(s) if Glucose 251 - 300  4 Unit(s) if Glucose 301 - 350  5 Unit(s) if Glucose 351 - 400  6 Unit(s) if Glucose Greater Than 400  insulin lispro 100 units/mL injectable solution: injectable once a day (at bedtime) Use sliding scale while at rehab:  0 Unit(s) if Glucose 0 - 250  1 Unit(s) if Glucose 251 - 300  2 Unit(s) if Glucose 301 - 350  3 Unit(s) if Glucose 351 - 400  4 Unit(s) if Glucose 400  ipratropium-albuterol 0.5 mg-2.5 mg/3 mL inhalation solution: 3 milliliter(s) inhaled every 6 hours as needed for  shortness of breath and/or wheezing  melatonin 3 mg oral tablet: 1 tab(s) orally once a day (at bedtime)  rivaroxaban 15 mg oral tablet: 1 tab(s) orally once a day (before a meal)

## 2024-05-19 NOTE — PROGRESS NOTE ADULT - SUBJECTIVE AND OBJECTIVE BOX
Patient is a 83y old  Male who presents with a chief complaint of Bradycardia, hypotension, altered mental status (18 May 2024 07:32)    Javier Theodore MD   [facility] Division of Hospital Medicine   Pager [phone]  Reachable on [employer] Teams     SUBJECTIVE / OVERNIGHT EVENTS:  Patient seen and examined this morning. No events overnight. States that he feels well, no issues with urination.    MEDICATIONS  (STANDING):  atorvastatin 20 milliGRAM(s) Oral at bedtime  chlorhexidine 2% Cloths 1 Application(s) Topical <User Schedule>  dextrose 10% Bolus 125 milliLiter(s) IV Bolus once  dextrose 5%. 1000 milliLiter(s) (100 mL/Hr) IV Continuous <Continuous>  dextrose 5%. 1000 milliLiter(s) (50 mL/Hr) IV Continuous <Continuous>  dextrose 50% Injectable 25 Gram(s) IV Push once  dextrose 50% Injectable 12.5 Gram(s) IV Push once  glucagon  Injectable 1 milliGRAM(s) IntraMuscular once  insulin lispro (ADMELOG) corrective regimen sliding scale   SubCutaneous three times a day before meals  insulin lispro (ADMELOG) corrective regimen sliding scale   SubCutaneous at bedtime  melatonin 3 milliGRAM(s) Oral at bedtime  rivaroxaban 15 milliGRAM(s) Oral with dinner  sodium chloride 0.9%. 1000 milliLiter(s) (100 mL/Hr) IV Continuous <Continuous>  tamsulosin 0.4 milliGRAM(s) Oral once    MEDICATIONS  (PRN):  dextrose Oral Gel 15 Gram(s) Oral once PRN Blood Glucose LESS THAN 70 milliGRAM(s)/deciliter      Vital Signs Last 24 Hrs  T(C): 36.2 (19 May 2024 05:00), Max: 36.9 (18 May 2024 12:00)  T(F): 97.2 (19 May 2024 05:00), Max: 98.4 (18 May 2024 12:00)  HR: 62 (19 May 2024 05:00) (62 - 84)  BP: 152/82 (19 May 2024 05:00) (146/59 - 152/82)  BP(mean): --  RR: 18 (19 May 2024 05:00) (18 - 18)  SpO2: 97% (19 May 2024 05:00) (97% - 100%)  CAPILLARY BLOOD GLUCOSE      POCT Blood Glucose.: 129 mg/dL (19 May 2024 08:30)  POCT Blood Glucose.: 186 mg/dL (18 May 2024 22:02)  POCT Blood Glucose.: 172 mg/dL (18 May 2024 17:29)  POCT Blood Glucose.: 109 mg/dL (18 May 2024 12:29)    I&O's Summary    18 May 2024 07:01  -  19 May 2024 07:00  --------------------------------------------------------  IN: 0 mL / OUT: 1300 mL / NET: -1300 mL      General: elderly man laying in bed appears comfortable in NAD, awake and alert  HENMT: NCAT, MMM  Respiratory: No respiratory distress, CTABL,   Cardiovascular: S1,S2; Regular rate and rhythm; No m/g/r.  Gastrointestinal: Soft, Nontender, Nondistended; +BS.   Extremities: No c/c/e; warm to touch  Neurological: Moving all 4 extremities; Sensation to LT grossly in tact.  Psych: appropriate mood and affect    LABS:                        11.6   5.20  )-----------( 122      ( 19 May 2024 05:45 )             36.8     05-19    140  |  105  |  20  ----------------------------<  124<H>  4.3   |  24  |  1.01    Ca    8.9      19 May 2024 05:45  Phos  3.4     05-19  Mg     1.70     05-19    TPro  6.6  /  Alb  3.6  /  TBili  0.5  /  DBili  x   /  AST  27  /  ALT  37  /  AlkPhos  78  05-17    PTT - ( 18 May 2024 05:00 )  PTT:41.1 sec      Urinalysis Basic - ( 19 May 2024 05:45 )    Color: x / Appearance: x / SG: x / pH: x  Gluc: 124 mg/dL / Ketone: x  / Bili: x / Urobili: x   Blood: x / Protein: x / Nitrite: x   Leuk Esterase: x / RBC: x / WBC x   Sq Epi: x / Non Sq Epi: x / Bacteria: x        RADIOLOGY & ADDITIONAL TESTS:    Imaging Personally Reviewed:    Consultant(s) Notes Reviewed:      Care Discussed with Consultants/Other Providers:

## 2024-05-19 NOTE — DISCHARGE NOTE PROVIDER - CARE PROVIDERS DIRECT ADDRESSES
,DirectAddress_Unknown,[email or web address]

## 2024-05-19 NOTE — DISCHARGE NOTE PROVIDER - CARE PROVIDER_API CALL
Garrett Clarke  Family Medicine  [address] [place] [id number]  Phone: [phone]  Fax: [phone]  Follow Up Time:     Paul Hobson  Cardiac Electrophysiology  [address] [place] [address], [address]  [place] [address]  Phone: [phone]  Fax: [phone]  Follow Up Time:

## 2024-05-19 NOTE — PROGRESS NOTE ADULT - ASSESSMENT
83M PMH Atrial Fibrillation on Xarelto, Hypertension, Hyperlipidemia, Type 2 Diabetes Mellitus with Diabetic Neuropathy, recently admitted (3/2024) for right upper lobe PNA presents to ED with symptomatic junctional bradycardia (30s), hypotension requiring levophed and altered mental status. Symptomatic junctional rhythm likely in setting of beta blocker toxicity given w/ response to glucagon.  Patient admitted to CCU and required levophed briefly. Now stable off vasopressors with HR and BP normalized.     # Toxin-induced cardiogenic shock, POA    # Beta-blocker toxicity, POA    # Symptomatic bradycardia  - resolved   - s/p levophed, HR/BP now normalized, lactate normalized  - TSH low, free T4 normal  - TTE with normal EF and no significant valvular disease   - Holding AV joan blocking agents and beta blockers (at home on dromidarone 400 and nebivolol 2.5)    # Chronic Afib  - CHADSVASc: 4   - Home meds: Xarelto, dromidarone, and nebivolol  - c/w home Xarelto   - appreciate EP recs, plan to d/c OFF AV joan medications due to bradycardia     # Hospital acquired delirium  - AAOx3, but confused and requires frequent reorientation  - maintain normal sleep/ wake cycles     # ANAMARIA  - Cr 1.7 (baseline <1)  - Likely ATN 2/2 hypotension, cr slowly improving   - c/w gentle IVF   - TOV in progress     # Hyperkalemia   - secondary to ANAMARIA, resolved   - holding home ACEi  - s/p Jackiema, IVF  - monitor on tele     # T2DM  - A1c 7.6  - On low dose insulin sliding scale TID and QHS    DVTppx: DOAC  Diet: CC/Halal  Dispo: pending PT/OT evals

## 2024-05-19 NOTE — DISCHARGE NOTE PROVIDER - HOSPITAL COURSE
83M PMH Atrial Fibrillation on Xarelto, Hypertension, Hyperlipidemia, Type 2 Diabetes Mellitus with Diabetic Neuropathy, recently admitted (3/2024) for right upper lobe PNA presents to ED with symptomatic junctional bradycardia (30s), hypotension requiring levophed and altered mental status. Symptomatic junctional rhythm likely in setting of beta blocker toxicity given w/ response to glucagon.  Patient admitted to CCU and required levophed briefly. HR and BP normalized and transferred to the floor. Patient was seen by EP who recommend that AV joan blocking agents /beta blockers  be held. Patient was seen by PT who recommended discharge to [facility]   83M PMH Atrial Fibrillation on Xarelto, Hypertension, Hyperlipidemia, Type 2 Diabetes Mellitus with Diabetic Neuropathy, recently admitted (3/2024) for right upper lobe PNA presents to ED with symptomatic junctional bradycardia (30s), hypotension requiring levophed and altered mental status. CTH 5/15 did not show any acute changes. Symptomatic junctional rhythm likely in setting of beta blocker toxicity given w/ response to glucagon. Patient admitted to CCU and required levophed briefly. HR and BP normalized and transferred to the floor. Patient was seen by EP who recommend that home dromidarone and nebivolol be held. TTE showed normal EF and no significant valvular disease. Hospital course c/b ANAMARIA since resolved likely ATN in the setting of hypotension. Course also c/b hyperkalemia since resolved patient is s/p [facility]. Patient was seen by PT who recommended discharge to [facility].

## 2024-05-20 LAB
ANION GAP SERPL CALC-SCNC: 11 MMOL/L — SIGNIFICANT CHANGE UP (ref 7–14)
BUN SERPL-MCNC: 20 MG/DL — SIGNIFICANT CHANGE UP (ref 7–23)
CALCIUM SERPL-MCNC: 9.4 MG/DL — SIGNIFICANT CHANGE UP (ref 8.4–10.5)
CHLORIDE SERPL-SCNC: 105 MMOL/L — SIGNIFICANT CHANGE UP (ref 98–107)
CO2 SERPL-SCNC: 24 MMOL/L — SIGNIFICANT CHANGE UP (ref 22–31)
CREAT SERPL-MCNC: 1.02 MG/DL — SIGNIFICANT CHANGE UP (ref 0.5–1.3)
EGFR: 72 ML/MIN/1.73M2 — SIGNIFICANT CHANGE UP
GLUCOSE BLDC GLUCOMTR-MCNC: 151 MG/DL — HIGH (ref 70–99)
GLUCOSE BLDC GLUCOMTR-MCNC: 153 MG/DL — HIGH (ref 70–99)
GLUCOSE BLDC GLUCOMTR-MCNC: 197 MG/DL — HIGH (ref 70–99)
GLUCOSE BLDC GLUCOMTR-MCNC: 212 MG/DL — HIGH (ref 70–99)
GLUCOSE SERPL-MCNC: 147 MG/DL — HIGH (ref 70–99)
HCT VFR BLD CALC: 40 % — SIGNIFICANT CHANGE UP (ref 39–50)
HGB BLD-MCNC: 12.7 G/DL — LOW (ref 13–17)
MAGNESIUM SERPL-MCNC: 1.8 MG/DL — SIGNIFICANT CHANGE UP (ref 1.6–2.6)
MCHC RBC-ENTMCNC: 25.9 PG — LOW (ref 27–34)
MCHC RBC-ENTMCNC: 31.8 GM/DL — LOW (ref 32–36)
MCV RBC AUTO: 81.6 FL — SIGNIFICANT CHANGE UP (ref 80–100)
NRBC # BLD: 0 /100 WBCS — SIGNIFICANT CHANGE UP (ref 0–0)
NRBC # FLD: 0 K/UL — SIGNIFICANT CHANGE UP (ref 0–0)
PHOSPHATE SERPL-MCNC: 3.2 MG/DL — SIGNIFICANT CHANGE UP (ref 2.5–4.5)
PLATELET # BLD AUTO: 130 K/UL — LOW (ref 150–400)
POTASSIUM SERPL-MCNC: 4.5 MMOL/L — SIGNIFICANT CHANGE UP (ref 3.5–5.3)
POTASSIUM SERPL-SCNC: 4.5 MMOL/L — SIGNIFICANT CHANGE UP (ref 3.5–5.3)
RBC # BLD: 4.9 M/UL — SIGNIFICANT CHANGE UP (ref 4.2–5.8)
RBC # FLD: 14.1 % — SIGNIFICANT CHANGE UP (ref 10.3–14.5)
SODIUM SERPL-SCNC: 140 MMOL/L — SIGNIFICANT CHANGE UP (ref 135–145)
WBC # BLD: 6.22 K/UL — SIGNIFICANT CHANGE UP (ref 3.8–10.5)
WBC # FLD AUTO: 6.22 K/UL — SIGNIFICANT CHANGE UP (ref 3.8–10.5)

## 2024-05-20 PROCEDURE — 99233 SBSQ HOSP IP/OBS HIGH 50: CPT

## 2024-05-20 PROCEDURE — 99231 SBSQ HOSP IP/OBS SF/LOW 25: CPT

## 2024-05-20 RX ORDER — MAGNESIUM SULFATE 500 MG/ML
1 VIAL (ML) INJECTION ONCE
Refills: 0 | Status: COMPLETED | OUTPATIENT
Start: 2024-05-20 | End: 2024-05-20

## 2024-05-20 RX ADMIN — ATORVASTATIN CALCIUM 20 MILLIGRAM(S): 80 TABLET, FILM COATED ORAL at 22:14

## 2024-05-20 RX ADMIN — RIVAROXABAN 15 MILLIGRAM(S): KIT at 17:46

## 2024-05-20 RX ADMIN — Medication 3 MILLIGRAM(S): at 22:14

## 2024-05-20 RX ADMIN — CHLORHEXIDINE GLUCONATE 1 APPLICATION(S): 213 SOLUTION TOPICAL at 05:35

## 2024-05-20 RX ADMIN — Medication 3 MILLILITER(S): at 05:47

## 2024-05-20 RX ADMIN — Medication 100 GRAM(S): at 14:29

## 2024-05-20 NOTE — PROGRESS NOTE ADULT - ASSESSMENT
83yoM w/ PMHx PAfib on Xarelto 20mg, HTN, HLD, DMII and CKD initially presented to the ED with AMS found to be hypotensive and bradycardic and was started on Levophed.  EKG consistent with junctional bradycardia down to 30's and lab work significant for elevated creatinine 1.7 (baseline 0.9) and hyperkalemic 6.0.  Was recently seen 2 months ago for PNA and was discharged to home.  CTH- no acute findings.  Was subsequently admitted to CCU for further monitoring.  On telemetry overnight was in NSR/junctional rhythm.  Remains on levophed. Patient's home meds Multaq and beta-blocker has been held since the admission.  His junctional rhythm resolved now,  Telemetry demonstrates NSR 60-80's bpm.  Remains hemodynamically stable     Junctional bradycardia in the setting of hyperkalemia and BB/Multaq use    Continue to monitor on telemetry    Echo:  normal LVEF 61%  Continue Xarelto for for thromboembolic prophylaxis, elevated CHADS2-VASC2 score is 4 ,   Keep K>4 Mg>2  Avoid AV joan blockers (was on BB at home)  No pacemaker indicated as his symptomatic bradycardic reversible off BB/Multaq and correction hyperkalemia  EPS to sign off

## 2024-05-20 NOTE — PROGRESS NOTE ADULT - SUBJECTIVE AND OBJECTIVE BOX
Interval History:  No acute events overnight  Telemetry: NSR    MEDICATIONS  (STANDING):  albuterol/ipratropium for Nebulization. 3 milliLiter(s) Nebulizer once  atorvastatin 20 milliGRAM(s) Oral at bedtime  chlorhexidine 2% Cloths 1 Application(s) Topical <User Schedule>  dextrose 10% Bolus 125 milliLiter(s) IV Bolus once  dextrose 5%. 1000 milliLiter(s) (50 mL/Hr) IV Continuous <Continuous>  dextrose 5%. 1000 milliLiter(s) (100 mL/Hr) IV Continuous <Continuous>  dextrose 50% Injectable 12.5 Gram(s) IV Push once  dextrose 50% Injectable 25 Gram(s) IV Push once  glucagon  Injectable 1 milliGRAM(s) IntraMuscular once  insulin lispro (ADMELOG) corrective regimen sliding scale   SubCutaneous three times a day before meals  insulin lispro (ADMELOG) corrective regimen sliding scale   SubCutaneous at bedtime  melatonin 3 milliGRAM(s) Oral at bedtime  rivaroxaban 15 milliGRAM(s) Oral with dinner  sodium chloride 0.9%. 1000 milliLiter(s) (100 mL/Hr) IV Continuous <Continuous>  tamsulosin 0.4 milliGRAM(s) Oral once    MEDICATIONS  (PRN):  albuterol/ipratropium for Nebulization 3 milliLiter(s) Nebulizer every 6 hours PRN Shortness of Breath and/or Wheezing  dextrose Oral Gel 15 Gram(s) Oral once PRN Blood Glucose LESS THAN 70 milliGRAM(s)/deciliter    Appearance: Normal	  HEENT:   Normal oral mucosa, PERRL, EOMI	  Lymphatic: No lymphadenopathy  Cardiovascular: Normal S1 S2, No JVD, No murmurs, No edema  Respiratory: Lungs clear to auscultation	  Psychiatry: A & O x 3, Mood & affect appropriate  Gastrointestinal:  Soft, Non-tender, + BS	  Skin: No rashes, No ecchymoses, No cyanosis	  Neurologic: Non-focal  Extremities: Normal range of motion, No clubbing, cyanosis or edema  Vascular: Peripheral pulses palpable 2+ bilaterally    LABS:	 	    CBC Full  -  ( 20 May 2024 05:20 )  WBC Count : 6.22 K/uL  Hemoglobin : 12.7 g/dL  Hematocrit : 40.0 %  Platelet Count - Automated : 130 K/uL  Mean Cell Volume : 81.6 fL  Mean Cell Hemoglobin : 25.9 pg  Mean Cell Hemoglobin Concentration : 31.8 gm/dL  Auto Neutrophil # : x  Auto Lymphocyte # : x  Auto Monocyte # : x  Auto Eosinophil # : x  Auto Basophil # : x  Auto Neutrophil % : x  Auto Lymphocyte % : x  Auto Monocyte % : x  Auto Eosinophil % : x  Auto Basophil % : x    05-20    140  |  105  |  20  ----------------------------<  147<H>  4.5   |  24  |  1.02  05-19    140  |  105  |  20  ----------------------------<  124<H>  4.3   |  24  |  1.01    Ca    9.4      20 May 2024 05:20  Ca    8.9      19 May 2024 05:45  Phos  3.2     05-20  Phos  3.4     05-19  Mg     1.80     05-20  Mg     1.70     05-19

## 2024-05-20 NOTE — PROGRESS NOTE ADULT - NS ATTEND AMEND GEN_ALL_CORE FT
reversible causes of coyr, improving, no PPM indicated.
83yoM w/ PMHx PAfib on Xarelto 20mg, HTN, HLD, DMII and CKD initially presented to the ED with AMS found to be hypotensive and bradycardic and was started on Levophed.  EKG consistent with junctional bradycardia down to 30's and lab work significant for elevated creatinine 1.7 (baseline 0.9) and hyperkalemic 6.0.  Was recently seen 2 months ago for PNA and was discharged to home.  CTH- no acute findings.  Was subsequently admitted to CCU for further monitoring.  On telemetry overnight was in NSR/junctional rhythm.  Remains on levophed. Patient's home meds Multaq and beta-blocker has been held since the admission.  His junctional rhythm resolved now,  Telemetry demonstrates NSR 60-80's bpm.  Remains hemodynamically stable. Echo:  normal LVEF 61%. Continue Xarelto for thromboembolic prophylaxis, elevated CHADS2-VASC2 score is 4. No pacemaker indicated as his symptomatic bradycardic reversible off BB/Multaq and correction hyperkalemia.

## 2024-05-20 NOTE — PROGRESS NOTE ADULT - ASSESSMENT
83M PMH Atrial Fibrillation on Xarelto, Hypertension, Hyperlipidemia, Type 2 Diabetes Mellitus with Diabetic Neuropathy, recently admitted (3/2024) for right upper lobe PNA presents to ED with symptomatic junctional bradycardia (30s), hypotension requiring levophed and altered mental status. Symptomatic junctional rhythm likely in setting of beta blocker toxicity given w/ response to glucagon.  Patient admitted to CCU and required levophed briefly. Now stable off vasopressors with HR and BP normalized.     # Toxin-induced cardiogenic shock, POA    # Beta-blocker toxicity, POA    # Symptomatic bradycardia  - resolved   - s/p levophed, HR/BP now normalized, lactate normalized  - TSH low, free T4 normal  - TTE with normal EF and no significant valvular disease   - Holding AV joan blocking agents and beta blockers (at home on dromidarone 400 and nebivolol 2.5)    # Chronic Afib  - CHADSVASc: 4   - Home meds: Xarelto, dromidarone, and nebivolol  - c/w home Xarelto   - appreciate EP recs, plan to d/c OFF AV joan medications due to bradycardia     # Hospital acquired delirium  - AAOx3, but confused and requires frequent reorientation  - maintain normal sleep/ wake cycles     # ANAMARIA (resolved)  - Cr 1.7-> 1.0  - Likely ATN 2/2 hypotension, cr slowly improving   - patient is s/p IVF  - Monitor BMP    # Hyperkalemia   - secondary to ANAMARIA, resolved   - holding home ACEI  - s/p Lokelma  - monitor on tele     # T2DM  - A1c 7.6  - On low dose insulin sliding scale TID and QHS  - Monitor FSG, maintain hypoglycemia protocol    #HTN   BP systolic 140-160s  Patient previously on levo this admission for hypotension  Monitor BP and assess appropriateness to restart home antihypertensive    DVTppx: DOAC  Diet: CC/Halal  Dispo: PT recommending MARY BETH, f/u SW

## 2024-05-20 NOTE — PROGRESS NOTE ADULT - SUBJECTIVE AND OBJECTIVE BOX
[facility] Division of Hospital Medicine  Woo Garcias DO  Available via MS Teams  Pager:[phone]    SUBJECTIVE / OVERNIGHT EVENTS: No acute events overnight. Patient denies any new symptoms. Patient denies chest pain, shortness of breath, dizziness, palpitations.    ADDITIONAL REVIEW OF SYSTEMS:  Review of Systems:   CONSTITUTIONAL: No fever, weight loss  EYES: No eye pain, visual disturbances, or discharge  ENMT:  No difficulty hearing, tinnitus, vertigo; No sinus or throat pain  RESPIRATORY: No SOB. No cough, wheezing, chills or hemoptysis  CARDIOVASCULAR: No chest pain, palpitations, dizziness, or leg swelling  GASTROINTESTINAL: No abdominal or epigastric pain. No nausea, vomiting, or hematemesis; No diarrhea or constipation. No melena or hematochezia.  GENITOURINARY: No dysuria, frequency, hematuria, or incontinence  NEUROLOGICAL: No headaches, memory loss, loss of strength, numbness, or tremors  SKIN: No itching, burning, rashes, or lesions   LYMPH NODES: No enlarged glands  ENDOCRINE: No heat or cold intolerance; No hair loss  MUSCULOSKELETAL: No joint pain or swelling; No muscle, back pain  PSYCHIATRIC: No depression, anxiety, mood swings, or difficulty sleeping  HEME/LYMPH: No easy bruising, or bleeding gums      MEDICATIONS  (STANDING):  albuterol/ipratropium for Nebulization. 3 milliLiter(s) Nebulizer once  atorvastatin 20 milliGRAM(s) Oral at bedtime  chlorhexidine 2% Cloths 1 Application(s) Topical <User Schedule>  dextrose 10% Bolus 125 milliLiter(s) IV Bolus once  dextrose 5%. 1000 milliLiter(s) (100 mL/Hr) IV Continuous <Continuous>  dextrose 5%. 1000 milliLiter(s) (50 mL/Hr) IV Continuous <Continuous>  dextrose 50% Injectable 25 Gram(s) IV Push once  dextrose 50% Injectable 12.5 Gram(s) IV Push once  glucagon  Injectable 1 milliGRAM(s) IntraMuscular once  insulin lispro (ADMELOG) corrective regimen sliding scale   SubCutaneous three times a day before meals  insulin lispro (ADMELOG) corrective regimen sliding scale   SubCutaneous at bedtime  magnesium sulfate  IVPB 1 Gram(s) IV Intermittent once  melatonin 3 milliGRAM(s) Oral at bedtime  rivaroxaban 15 milliGRAM(s) Oral with dinner  sodium chloride 0.9%. 1000 milliLiter(s) (100 mL/Hr) IV Continuous <Continuous>  tamsulosin 0.4 milliGRAM(s) Oral once    MEDICATIONS  (PRN):  albuterol/ipratropium for Nebulization 3 milliLiter(s) Nebulizer every 6 hours PRN Shortness of Breath and/or Wheezing  dextrose Oral Gel 15 Gram(s) Oral once PRN Blood Glucose LESS THAN 70 milliGRAM(s)/deciliter      I&O's Summary      PHYSICAL EXAM:  Vital Signs Last 24 Hrs  T(C): 36.7 (20 May 2024 05:40), Max: 36.7 (20 May 2024 05:40)  T(F): 98.1 (20 May 2024 05:40), Max: 98.1 (20 May 2024 05:40)  HR: 67 (20 May 2024 05:40) (66 - 67)  BP: 166/97 (20 May 2024 05:40) (161/94 - 166/97)  BP(mean): --  RR: 18 (20 May 2024 05:40) (17 - 18)  SpO2: 97% (20 May 2024 05:40) (97% - 99%)    Parameters below as of 20 May 2024 05:40  Patient On (Oxygen Delivery Method): room air      CONSTITUTIONAL: NAD, well-groomed  EYES: PERRLA; conjunctiva and sclera clear  ENMT: Moist oral mucosa, no pharyngeal injection or exudates; normal dentition  NECK: Supple, no palpable masses; no thyromegaly  RESPIRATORY: Normal respiratory effort; lungs are clear to auscultation bilaterally  CARDIOVASCULAR: Regular rate and rhythm, normal S1 and S2, no murmur/rub/gallop; No lower extremity edema; Peripheral pulses are 2+ bilaterally  ABDOMEN: Nontender to palpation, normoactive bowel sounds, no rebound/guarding; No hepatosplenomegaly  MUSCULOSKELETAL:  Normal gait; no clubbing or cyanosis of digits; no joint swelling or tenderness to palpation  PSYCH: A+O to person, place, and time; affect appropriate  NEUROLOGY: CN 2-12 are intact and symmetric; no gross sensory deficits   SKIN: No rashes; no palpable lesions    LABS:                        12.7   6.22  )-----------( 130      ( 20 May 2024 05:20 )             40.0     05-20    140  |  105  |  20  ----------------------------<  147<H>  4.5   |  24  |  1.02    Ca    9.4      20 May 2024 05:20  Phos  3.2     05-20  Mg     1.80     05-20            Urinalysis Basic - ( 20 May 2024 05:20 )    Color: x / Appearance: x / SG: x / pH: x  Gluc: 147 mg/dL / Ketone: x  / Bili: x / Urobili: x   Blood: x / Protein: x / Nitrite: x   Leuk Esterase: x / RBC: x / WBC x   Sq Epi: x / Non Sq Epi: x / Bacteria: x            RADIOLOGY & ADDITIONAL TESTS:  New Imaging Personally Reviewed Today: Yes  New Electrocardiogram Personally Reviewed Today: Yes  Other Results Reviewed Today: Yes  Prior or Outpatient Records Reviewed Today with Summary: Yes    COORDINATION OF CARE:  Consultant Communication and Details of Discussion (where applicable): Yes     ABDOMINAL PAIN/NAUSEA/VOMITING

## 2024-05-21 LAB
ANION GAP SERPL CALC-SCNC: 11 MMOL/L — SIGNIFICANT CHANGE UP (ref 7–14)
BUN SERPL-MCNC: 22 MG/DL — SIGNIFICANT CHANGE UP (ref 7–23)
CALCIUM SERPL-MCNC: 9.7 MG/DL — SIGNIFICANT CHANGE UP (ref 8.4–10.5)
CHLORIDE SERPL-SCNC: 104 MMOL/L — SIGNIFICANT CHANGE UP (ref 98–107)
CO2 SERPL-SCNC: 25 MMOL/L — SIGNIFICANT CHANGE UP (ref 22–31)
CREAT SERPL-MCNC: 0.89 MG/DL — SIGNIFICANT CHANGE UP (ref 0.5–1.3)
CULTURE RESULTS: SIGNIFICANT CHANGE UP
CULTURE RESULTS: SIGNIFICANT CHANGE UP
EGFR: 84 ML/MIN/1.73M2 — SIGNIFICANT CHANGE UP
GLUCOSE BLDC GLUCOMTR-MCNC: 176 MG/DL — HIGH (ref 70–99)
GLUCOSE BLDC GLUCOMTR-MCNC: 188 MG/DL — HIGH (ref 70–99)
GLUCOSE BLDC GLUCOMTR-MCNC: 211 MG/DL — HIGH (ref 70–99)
GLUCOSE BLDC GLUCOMTR-MCNC: 253 MG/DL — HIGH (ref 70–99)
GLUCOSE SERPL-MCNC: 156 MG/DL — HIGH (ref 70–99)
HCT VFR BLD CALC: 37.9 % — LOW (ref 39–50)
HGB BLD-MCNC: 11.9 G/DL — LOW (ref 13–17)
MAGNESIUM SERPL-MCNC: 1.9 MG/DL — SIGNIFICANT CHANGE UP (ref 1.6–2.6)
MCHC RBC-ENTMCNC: 25.7 PG — LOW (ref 27–34)
MCHC RBC-ENTMCNC: 31.4 GM/DL — LOW (ref 32–36)
MCV RBC AUTO: 81.9 FL — SIGNIFICANT CHANGE UP (ref 80–100)
MRSA PCR RESULT.: SIGNIFICANT CHANGE UP
NRBC # BLD: 0 /100 WBCS — SIGNIFICANT CHANGE UP (ref 0–0)
NRBC # FLD: 0 K/UL — SIGNIFICANT CHANGE UP (ref 0–0)
PHOSPHATE SERPL-MCNC: 3.8 MG/DL — SIGNIFICANT CHANGE UP (ref 2.5–4.5)
PLATELET # BLD AUTO: 129 K/UL — LOW (ref 150–400)
POTASSIUM SERPL-MCNC: 4.3 MMOL/L — SIGNIFICANT CHANGE UP (ref 3.5–5.3)
POTASSIUM SERPL-SCNC: 4.3 MMOL/L — SIGNIFICANT CHANGE UP (ref 3.5–5.3)
RBC # BLD: 4.63 M/UL — SIGNIFICANT CHANGE UP (ref 4.2–5.8)
RBC # FLD: 14.6 % — HIGH (ref 10.3–14.5)
S AUREUS DNA NOSE QL NAA+PROBE: SIGNIFICANT CHANGE UP
SODIUM SERPL-SCNC: 140 MMOL/L — SIGNIFICANT CHANGE UP (ref 135–145)
SPECIMEN SOURCE: SIGNIFICANT CHANGE UP
SPECIMEN SOURCE: SIGNIFICANT CHANGE UP
WBC # BLD: 5.98 K/UL — SIGNIFICANT CHANGE UP (ref 3.8–10.5)
WBC # FLD AUTO: 5.98 K/UL — SIGNIFICANT CHANGE UP (ref 3.8–10.5)

## 2024-05-21 PROCEDURE — 99233 SBSQ HOSP IP/OBS HIGH 50: CPT

## 2024-05-21 RX ADMIN — Medication 3: at 17:15

## 2024-05-21 RX ADMIN — Medication 3 MILLIGRAM(S): at 21:06

## 2024-05-21 RX ADMIN — Medication 1: at 12:10

## 2024-05-21 RX ADMIN — RIVAROXABAN 15 MILLIGRAM(S): KIT at 17:16

## 2024-05-21 RX ADMIN — ATORVASTATIN CALCIUM 20 MILLIGRAM(S): 80 TABLET, FILM COATED ORAL at 21:06

## 2024-05-21 RX ADMIN — TAMSULOSIN HYDROCHLORIDE 0.4 MILLIGRAM(S): 0.4 CAPSULE ORAL at 15:04

## 2024-05-21 RX ADMIN — Medication 2: at 08:32

## 2024-05-21 RX ADMIN — CHLORHEXIDINE GLUCONATE 1 APPLICATION(S): 213 SOLUTION TOPICAL at 05:35

## 2024-05-21 NOTE — PROGRESS NOTE ADULT - PROVIDER SPECIALTY LIST ADULT
Electrophysiology
Hospitalist
Electrophysiology
Hospitalist
Internal Medicine
CCU
Hospitalist
Internal Medicine
CCU

## 2024-05-21 NOTE — PROGRESS NOTE ADULT - REASON FOR ADMISSION
Bradycardia, hypotension, altered mental status

## 2024-05-21 NOTE — PROGRESS NOTE ADULT - SUBJECTIVE AND OBJECTIVE BOX
[facility] Division of Hospital Medicine  Woo Garcias DO  Available via MS Teams  Pager:[phone]    SUBJECTIVE / OVERNIGHT EVENTS: No acute events overnight. Patient denies any complaints.    ADDITIONAL REVIEW OF SYSTEMS:  Review of Systems:   CONSTITUTIONAL: No fever, weight loss  EYES: No eye pain, visual disturbances, or discharge  ENMT:  No difficulty hearing, tinnitus, vertigo; No sinus or throat pain  RESPIRATORY: No SOB. No cough, wheezing, chills or hemoptysis  CARDIOVASCULAR: No chest pain, palpitations, dizziness, or leg swelling  GASTROINTESTINAL: No abdominal or epigastric pain. No nausea, vomiting, or hematemesis; No diarrhea or constipation. No melena or hematochezia.  GENITOURINARY: No dysuria, frequency, hematuria, or incontinence  NEUROLOGICAL: No headaches, memory loss, loss of strength, numbness, or tremors  SKIN: No itching, burning, rashes, or lesions   LYMPH NODES: No enlarged glands  ENDOCRINE: No heat or cold intolerance; No hair loss  MUSCULOSKELETAL: No joint pain or swelling; No muscle, back pain  PSYCHIATRIC: No depression, anxiety, mood swings, or difficulty sleeping  HEME/LYMPH: No easy bruising, or bleeding gums      MEDICATIONS  (STANDING):  albuterol/ipratropium for Nebulization. 3 milliLiter(s) Nebulizer once  atorvastatin 20 milliGRAM(s) Oral at bedtime  chlorhexidine 2% Cloths 1 Application(s) Topical <User Schedule>  dextrose 10% Bolus 125 milliLiter(s) IV Bolus once  dextrose 5%. 1000 milliLiter(s) (50 mL/Hr) IV Continuous <Continuous>  dextrose 5%. 1000 milliLiter(s) (100 mL/Hr) IV Continuous <Continuous>  dextrose 50% Injectable 25 Gram(s) IV Push once  dextrose 50% Injectable 12.5 Gram(s) IV Push once  glucagon  Injectable 1 milliGRAM(s) IntraMuscular once  insulin lispro (ADMELOG) corrective regimen sliding scale   SubCutaneous three times a day before meals  insulin lispro (ADMELOG) corrective regimen sliding scale   SubCutaneous at bedtime  melatonin 3 milliGRAM(s) Oral at bedtime  rivaroxaban 15 milliGRAM(s) Oral with dinner  sodium chloride 0.9%. 1000 milliLiter(s) (100 mL/Hr) IV Continuous <Continuous>  tamsulosin 0.4 milliGRAM(s) Oral once    MEDICATIONS  (PRN):  albuterol/ipratropium for Nebulization 3 milliLiter(s) Nebulizer every 6 hours PRN Shortness of Breath and/or Wheezing  dextrose Oral Gel 15 Gram(s) Oral once PRN Blood Glucose LESS THAN 70 milliGRAM(s)/deciliter      I&O's Summary      PHYSICAL EXAM:  Vital Signs Last 24 Hrs  T(C): 36.7 (21 May 2024 05:33), Max: 36.7 (20 May 2024 22:10)  T(F): 98 (21 May 2024 05:33), Max: 98.1 (20 May 2024 22:10)  HR: 70 (21 May 2024 05:33) (70 - 74)  BP: 151/81 (21 May 2024 05:33) (151/81 - 157/81)  BP(mean): --  RR: 17 (21 May 2024 05:33) (17 - 18)  SpO2: 97% (21 May 2024 05:33) (96% - 98%)    Parameters below as of 21 May 2024 05:33  Patient On (Oxygen Delivery Method): room air      CONSTITUTIONAL: NAD, well-groomed  EYES: PERRLA; conjunctiva and sclera clear  ENMT: Moist oral mucosa, no pharyngeal injection or exudates; normal dentition  NECK: Supple, no palpable masses; no thyromegaly  RESPIRATORY: Normal respiratory effort; lungs are clear to auscultation bilaterally  CARDIOVASCULAR: Regular rate and rhythm, normal S1 and S2, no murmur/rub/gallop; No lower extremity edema; Peripheral pulses are 2+ bilaterally  ABDOMEN: Nontender to palpation, normoactive bowel sounds, no rebound/guarding; No hepatosplenomegaly  MUSCULOSKELETAL:  Normal gait; no clubbing or cyanosis of digits; no joint swelling or tenderness to palpation  PSYCH: A+O to person, place, and time; affect appropriate  NEUROLOGY: CN 2-12 are intact and symmetric; no gross sensory deficits   SKIN: No rashes; no palpable lesions    LABS:                        11.9   5.98  )-----------( 129      ( 21 May 2024 05:05 )             37.9     05-21    140  |  104  |  22  ----------------------------<  156<H>  4.3   |  25  |  0.89    Ca    9.7      21 May 2024 05:05  Phos  3.8     05-21  Mg     1.90     05-21            Urinalysis Basic - ( 21 May 2024 05:05 )    Color: x / Appearance: x / SG: x / pH: x  Gluc: 156 mg/dL / Ketone: x  / Bili: x / Urobili: x   Blood: x / Protein: x / Nitrite: x   Leuk Esterase: x / RBC: x / WBC x   Sq Epi: x / Non Sq Epi: x / Bacteria: x            RADIOLOGY & ADDITIONAL TESTS:  New Imaging Personally Reviewed Today: Yes  New Electrocardiogram Personally Reviewed Today: N/A  Other Results Reviewed Today: Yes  Prior or Outpatient Records Reviewed Today with Summary: Yes    COORDINATION OF CARE:  Consultant Communication and Details of Discussion (where applicable): Yes

## 2024-05-21 NOTE — PROGRESS NOTE ADULT - TIME BILLING
50 minutes of total time dedicated to this patient visit today including preparing to see the patient (eg. review of tests), obtaining and/or reviewing separately obtained history, obtaining/reviewing vitals, performing a medically appropriate examination and/or evaluation, counseling and educating the patient/family/caregiver, reviewing previous notes and test results, and procedures, communicating with other health professionals (when not separately reported), and documenting clinical information in the electronic health record).
50 minutes of total time dedicated to this patient visit today including preparing to see the patient (eg. review of tests), obtaining and/or reviewing separately obtained history, obtaining/reviewing vitals, performing a medically appropriate examination and/or evaluation, counseling and educating the patient/family/caregiver, reviewing previous notes and test results, and procedures, communicating with other health professionals (when not separately reported), and documenting clinical information in the electronic health record).
review of laboratory data, radiology results, discussion with patient/family, discussing care with interdisciplinary staff, and documenting in [place]. Additional interventions were performed as documented above.

## 2024-05-21 NOTE — PROGRESS NOTE ADULT - NUTRITIONAL ASSESSMENT
This patient has been assessed with a concern for Malnutrition and has been determined to have a diagnosis/diagnoses of Severe protein-calorie malnutrition.    This patient is being managed with:   Diet Consistent Carbohydrate/No Snacks-  Halal  No Pork  Supplement Feeding Modality:  Oral  Glucerna Shake Cans or Servings Per Day:  2       Frequency:  Daily  Entered: May 16 2024  6:56PM  

## 2024-05-21 NOTE — PROGRESS NOTE ADULT - ASSESSMENT
83M PMH Atrial Fibrillation on Xarelto, Hypertension, Hyperlipidemia, Type 2 Diabetes Mellitus with Diabetic Neuropathy, recently admitted (3/2024) for right upper lobe PNA presents to ED with symptomatic junctional bradycardia (30s), hypotension requiring levophed and altered mental status. Symptomatic junctional rhythm likely in setting of beta blocker toxicity given w/ response to glucagon. Patient admitted to CCU and required levophed briefly. Now stable off vasopressors with HR and BP normalized.     # Toxin-induced cardiogenic shock, POA    # Beta-blocker toxicity, POA    # Symptomatic bradycardia  - resolved   - s/p levophed, HR/BP now normalized, lactate normalized  - TSH low, free T4 normal  - TTE with normal EF and no significant valvular disease   - Holding AV joan blocking agents and beta blockers (at home on dromidarone 400 and nebivolol 2.5)    # Chronic Afib  - CHADSVASc: 4   - Home meds: Xarelto, dromidarone, and nebivolol  - c/w home Xarelto   - appreciate EP recs, plan to d/c OFF AV joan medications due to bradycardia     # Hospital acquired delirium  - AAOx3, but confused and requires frequent reorientation  - maintain normal sleep/ wake cycles     # ANAMARIA (resolved)  - Cr 1.7-> 1.0  - Likely ATN 2/2 hypotension  - patient is s/p IVF  - Monitor BMP    # Hyperkalemia   - secondary to ANAMARIA, resolved   - s/p Lokelma  - monitor BMP     # T2DM  - A1c 7.6  - On low dose insulin sliding scale TID and QHS  - Monitor FSG, maintain hypoglycemia protocol    #HTN   BP systolic 150s  Patient previously on levo this admission for hypotension  Monitor BP and assess appropriateness to restart home antihypertensive    DVTppx: DOAC  Diet: CC/Halal  Dispo: PT recommending MARY BETH, f/u SW

## 2024-05-22 ENCOUNTER — TRANSCRIPTION ENCOUNTER (OUTPATIENT)
Age: 84
End: 2024-05-22

## 2024-05-22 VITALS
DIASTOLIC BLOOD PRESSURE: 78 MMHG | TEMPERATURE: 98 F | OXYGEN SATURATION: 99 % | HEART RATE: 64 BPM | RESPIRATION RATE: 18 BRPM | SYSTOLIC BLOOD PRESSURE: 127 MMHG

## 2024-05-22 LAB
ANION GAP SERPL CALC-SCNC: 12 MMOL/L — SIGNIFICANT CHANGE UP (ref 7–14)
BUN SERPL-MCNC: 26 MG/DL — HIGH (ref 7–23)
CALCIUM SERPL-MCNC: 9.5 MG/DL — SIGNIFICANT CHANGE UP (ref 8.4–10.5)
CHLORIDE SERPL-SCNC: 104 MMOL/L — SIGNIFICANT CHANGE UP (ref 98–107)
CO2 SERPL-SCNC: 23 MMOL/L — SIGNIFICANT CHANGE UP (ref 22–31)
CREAT SERPL-MCNC: 1.11 MG/DL — SIGNIFICANT CHANGE UP (ref 0.5–1.3)
EGFR: 65 ML/MIN/1.73M2 — SIGNIFICANT CHANGE UP
FERRITIN SERPL-MCNC: 30 NG/ML — SIGNIFICANT CHANGE UP (ref 30–400)
GLUCOSE BLDC GLUCOMTR-MCNC: 130 MG/DL — HIGH (ref 70–99)
GLUCOSE BLDC GLUCOMTR-MCNC: 172 MG/DL — HIGH (ref 70–99)
GLUCOSE SERPL-MCNC: 181 MG/DL — HIGH (ref 70–99)
HCT VFR BLD CALC: 37.9 % — LOW (ref 39–50)
HGB BLD-MCNC: 12.2 G/DL — LOW (ref 13–17)
IRON SATN MFR SERPL: 16 % — SIGNIFICANT CHANGE UP (ref 14–50)
IRON SATN MFR SERPL: 60 UG/DL — SIGNIFICANT CHANGE UP (ref 45–165)
MAGNESIUM SERPL-MCNC: 1.7 MG/DL — SIGNIFICANT CHANGE UP (ref 1.6–2.6)
MCHC RBC-ENTMCNC: 26 PG — LOW (ref 27–34)
MCHC RBC-ENTMCNC: 32.2 GM/DL — SIGNIFICANT CHANGE UP (ref 32–36)
MCV RBC AUTO: 80.8 FL — SIGNIFICANT CHANGE UP (ref 80–100)
NRBC # BLD: 0 /100 WBCS — SIGNIFICANT CHANGE UP (ref 0–0)
NRBC # FLD: 0 K/UL — SIGNIFICANT CHANGE UP (ref 0–0)
PHOSPHATE SERPL-MCNC: 3.2 MG/DL — SIGNIFICANT CHANGE UP (ref 2.5–4.5)
PLATELET # BLD AUTO: 150 K/UL — SIGNIFICANT CHANGE UP (ref 150–400)
POTASSIUM SERPL-MCNC: 4.6 MMOL/L — SIGNIFICANT CHANGE UP (ref 3.5–5.3)
POTASSIUM SERPL-SCNC: 4.6 MMOL/L — SIGNIFICANT CHANGE UP (ref 3.5–5.3)
RBC # BLD: 4.69 M/UL — SIGNIFICANT CHANGE UP (ref 4.2–5.8)
RBC # FLD: 14.8 % — HIGH (ref 10.3–14.5)
SODIUM SERPL-SCNC: 139 MMOL/L — SIGNIFICANT CHANGE UP (ref 135–145)
TIBC SERPL-MCNC: 365 UG/DL — SIGNIFICANT CHANGE UP (ref 220–430)
UIBC SERPL-MCNC: 305 UG/DL — SIGNIFICANT CHANGE UP (ref 110–370)
WBC # BLD: 5.63 K/UL — SIGNIFICANT CHANGE UP (ref 3.8–10.5)
WBC # FLD AUTO: 5.63 K/UL — SIGNIFICANT CHANGE UP (ref 3.8–10.5)

## 2024-05-22 PROCEDURE — 99239 HOSP IP/OBS DSCHRG MGMT >30: CPT | Mod: GC

## 2024-05-22 RX ORDER — DRONEDARONE 400 MG/1
1 TABLET, FILM COATED ORAL
Refills: 0 | DISCHARGE

## 2024-05-22 RX ORDER — NEBIVOLOL HYDROCHLORIDE 5 MG/1
1 TABLET ORAL
Refills: 0 | DISCHARGE

## 2024-05-22 RX ORDER — INSULIN LISPRO 100/ML
0 VIAL (ML) SUBCUTANEOUS
Qty: 0 | Refills: 0 | DISCHARGE
Start: 2024-05-22

## 2024-05-22 RX ORDER — RIVAROXABAN 15 MG-20MG
1 KIT ORAL
Refills: 0 | DISCHARGE

## 2024-05-22 RX ORDER — RIVAROXABAN 15 MG-20MG
1 KIT ORAL
Qty: 0 | Refills: 0 | DISCHARGE
Start: 2024-05-22

## 2024-05-22 RX ORDER — BENAZEPRIL HYDROCHLORIDE 40 MG/1
1 TABLET ORAL
Refills: 0 | DISCHARGE

## 2024-05-22 RX ORDER — LANOLIN ALCOHOL/MO/W.PET/CERES
1 CREAM (GRAM) TOPICAL
Qty: 0 | Refills: 0 | DISCHARGE
Start: 2024-05-22

## 2024-05-22 RX ORDER — GABAPENTIN 400 MG/1
1 CAPSULE ORAL
Refills: 0 | DISCHARGE

## 2024-05-22 RX ADMIN — CHLORHEXIDINE GLUCONATE 1 APPLICATION(S): 213 SOLUTION TOPICAL at 05:33

## 2024-05-22 RX ADMIN — Medication 1: at 08:15

## 2024-05-22 NOTE — DISCHARGE NOTE NURSING/CASE MANAGEMENT/SOCIAL WORK - NSDCPEFALRISK_GEN_ALL_CORE
For information on Fall & Injury Prevention, visit: [email or web address] OR  [email or web address] OR  https://www.cdc.gov/steadi/patient.html

## 2024-05-22 NOTE — DISCHARGE NOTE NURSING/CASE MANAGEMENT/SOCIAL WORK - PATIENT PORTAL LINK FT
You can access the FollowMyHealth Patient Portal offered by [facility] by registering at the following website: [email or web address]. By joining [facility]’s FollowMyHealth portal, you will also be able to view your health information using other applications (apps) compatible with our system.

## 2024-07-08 ENCOUNTER — NON-APPOINTMENT (OUTPATIENT)
Age: 84
End: 2024-07-08

## 2024-07-08 ENCOUNTER — APPOINTMENT (OUTPATIENT)
Dept: ELECTROPHYSIOLOGY | Facility: CLINIC | Age: 84
End: 2024-07-08
Payer: MEDICARE

## 2024-07-08 VITALS
SYSTOLIC BLOOD PRESSURE: 149 MMHG | HEART RATE: 74 BPM | OXYGEN SATURATION: 97 % | BODY MASS INDEX: 23.19 KG/M2 | HEIGHT: 68 IN | WEIGHT: 153 LBS | DIASTOLIC BLOOD PRESSURE: 80 MMHG

## 2024-07-08 DIAGNOSIS — R00.1 BRADYCARDIA, UNSPECIFIED: ICD-10-CM

## 2024-07-08 DIAGNOSIS — Z78.9 OTHER SPECIFIED HEALTH STATUS: ICD-10-CM

## 2024-07-08 DIAGNOSIS — I48.91 UNSPECIFIED ATRIAL FIBRILLATION: ICD-10-CM

## 2024-07-08 DIAGNOSIS — E78.5 HYPERLIPIDEMIA, UNSPECIFIED: ICD-10-CM

## 2024-07-08 DIAGNOSIS — I10 ESSENTIAL (PRIMARY) HYPERTENSION: ICD-10-CM

## 2024-07-08 PROCEDURE — 93000 ELECTROCARDIOGRAM COMPLETE: CPT

## 2024-07-08 PROCEDURE — 99215 OFFICE O/P EST HI 40 MIN: CPT | Mod: 25

## 2024-07-09 PROBLEM — Z78.9 CURRENT NON-SMOKER: Status: ACTIVE | Noted: 2024-07-09
